# Patient Record
Sex: FEMALE | Race: BLACK OR AFRICAN AMERICAN | Employment: FULL TIME | URBAN - METROPOLITAN AREA
[De-identification: names, ages, dates, MRNs, and addresses within clinical notes are randomized per-mention and may not be internally consistent; named-entity substitution may affect disease eponyms.]

---

## 2017-02-27 NOTE — TELEPHONE ENCOUNTER
No future appointments.     LOV 2/16     LAST LAB    LAST RX  ValACYclovir HCl (VALTREX) 500 MG Oral Tab 90 tablet 2 2/24/2016      Sig :  1/2 tab daily        PROTOCOL  Herpes Agent Protocol Failed2/25 7:22 PM   Appointment in the past 12 or next 3 months

## 2017-02-28 RX ORDER — VALACYCLOVIR HYDROCHLORIDE 500 MG/1
TABLET, FILM COATED ORAL
Qty: 45 TABLET | Refills: 0 | OUTPATIENT
Start: 2017-02-28

## 2017-02-28 NOTE — TELEPHONE ENCOUNTER
Called patient's cell and left message to call clinic to schedule an appointment so we can refill medication.

## 2017-07-17 ENCOUNTER — OFFICE VISIT (OUTPATIENT)
Dept: FAMILY MEDICINE CLINIC | Facility: CLINIC | Age: 47
End: 2017-07-17

## 2017-07-17 VITALS
HEIGHT: 65 IN | SYSTOLIC BLOOD PRESSURE: 140 MMHG | BODY MASS INDEX: 27.66 KG/M2 | HEART RATE: 78 BPM | TEMPERATURE: 98 F | WEIGHT: 166 LBS | DIASTOLIC BLOOD PRESSURE: 76 MMHG

## 2017-07-17 DIAGNOSIS — N39.0 URINARY TRACT INFECTION, SITE UNSPECIFIED: ICD-10-CM

## 2017-07-17 DIAGNOSIS — R30.0 DYSURIA: Primary | ICD-10-CM

## 2017-07-17 LAB
MULTISTIX LOT#: NORMAL NUMERIC
NITRITE, URINE: POSITIVE
PH, URINE: 6 (ref 4.5–8)
PROTEIN (URINE DIPSTICK): 100 MG/DL
SPECIFIC GRAVITY: 1.02 (ref 1–1.03)
URINE-COLOR: YELLOW
UROBILINOGEN,SEMI-QN: 1 MG/DL (ref 0–1.9)

## 2017-07-17 PROCEDURE — 81003 URINALYSIS AUTO W/O SCOPE: CPT | Performed by: NURSE PRACTITIONER

## 2017-07-17 PROCEDURE — 87086 URINE CULTURE/COLONY COUNT: CPT | Performed by: NURSE PRACTITIONER

## 2017-07-17 PROCEDURE — 87186 SC STD MICRODIL/AGAR DIL: CPT | Performed by: NURSE PRACTITIONER

## 2017-07-17 PROCEDURE — 87088 URINE BACTERIA CULTURE: CPT | Performed by: NURSE PRACTITIONER

## 2017-07-17 PROCEDURE — 99213 OFFICE O/P EST LOW 20 MIN: CPT | Performed by: NURSE PRACTITIONER

## 2017-07-17 RX ORDER — SULFAMETHOXAZOLE AND TRIMETHOPRIM 800; 160 MG/1; MG/1
1 TABLET ORAL 2 TIMES DAILY
Qty: 10 TABLET | Refills: 0 | Status: SHIPPED | OUTPATIENT
Start: 2017-07-17 | End: 2017-07-22

## 2017-07-18 NOTE — PROGRESS NOTES
Romeo Vaca is a 42year old female. HPI:   Patient presents with urinary symptoms. Complaining of urinary frequency, urgency, suprapubic pain, back pain. Denies fever, hematuria.  Traveled from [place] where symptoms started  Duration: 2 days  Sexual ac GLUCOSE (URINE DIPSTICK) Neg Negative mg/dL   BILIRUBIN Neg Negative   KETONES (URINE DIPSTICK) Neg Negative mg/dL   SPECIFIC GRAVITY 1.020 1.005 - 1.030   OCCULT BLOOD Moderate Negative   PH, URINE 6.0 4.5 - 8.0   PROTEIN (URINE DIPSTICK) 100 Negative/T

## 2017-10-15 ENCOUNTER — OFFICE VISIT (OUTPATIENT)
Dept: FAMILY MEDICINE CLINIC | Facility: CLINIC | Age: 47
End: 2017-10-15

## 2017-10-15 VITALS
SYSTOLIC BLOOD PRESSURE: 140 MMHG | RESPIRATION RATE: 20 BRPM | HEART RATE: 78 BPM | WEIGHT: 166 LBS | BODY MASS INDEX: 27.66 KG/M2 | OXYGEN SATURATION: 100 % | TEMPERATURE: 98 F | DIASTOLIC BLOOD PRESSURE: 70 MMHG | HEIGHT: 65 IN

## 2017-10-15 DIAGNOSIS — R30.0 DYSURIA: ICD-10-CM

## 2017-10-15 DIAGNOSIS — N30.01 ACUTE CYSTITIS WITH HEMATURIA: Primary | ICD-10-CM

## 2017-10-15 PROCEDURE — 87186 SC STD MICRODIL/AGAR DIL: CPT | Performed by: NURSE PRACTITIONER

## 2017-10-15 PROCEDURE — 99213 OFFICE O/P EST LOW 20 MIN: CPT | Performed by: NURSE PRACTITIONER

## 2017-10-15 PROCEDURE — 87088 URINE BACTERIA CULTURE: CPT | Performed by: NURSE PRACTITIONER

## 2017-10-15 PROCEDURE — 81003 URINALYSIS AUTO W/O SCOPE: CPT | Performed by: NURSE PRACTITIONER

## 2017-10-15 PROCEDURE — 87086 URINE CULTURE/COLONY COUNT: CPT | Performed by: NURSE PRACTITIONER

## 2017-10-15 RX ORDER — NITROFURANTOIN 25; 75 MG/1; MG/1
100 CAPSULE ORAL 2 TIMES DAILY
Qty: 10 CAPSULE | Refills: 0 | Status: SHIPPED | OUTPATIENT
Start: 2017-10-15 | End: 2017-10-20

## 2017-10-15 NOTE — PROGRESS NOTES
Jordi Victor is a 41year old female. HPI:   Patient presents with symptoms of UTI for 2  days. Complaining of urinary frequency, urgency, dysuria. Denies back pain, fever, hematuria.   Pt has history of UTI in past.      No current outpatient presc Bladder Infection, Female (Adult)    Urine is normally doesn't have any bacteria in it. But bacteria can get into the urinary tract from the skin around the rectum. Or they can travel in the blood from elsewhere in the body.  Once they are in your urinary t · Bowel incontinence  · Pregnancy  · Procedures such as having a catheter inserted  · Older age  · Not emptying your bladder. This can allow bacteria a chance to grow in your urine.   · Dehydration  · Constipation  · Sex  · Use of a diaphragm for birth cont · Avoid caffeine, alcohol, and spicy foods. These can irritate your bladder. · Urinate right after intercourse to flush out your bladder. · If you use birth control pills and have frequent bladder infections, discuss it with your doctor.   Follow-up care

## 2018-02-08 RX ORDER — VALACYCLOVIR HYDROCHLORIDE 500 MG/1
TABLET, FILM COATED ORAL
Qty: 0 TABLET | Refills: 1 | OUTPATIENT
Start: 2018-02-08

## 2018-02-08 NOTE — TELEPHONE ENCOUNTER
Future Appointments  Date Time Provider Andrew Jil   2/14/2018 12:30 PM Matt Carlin MD [facility] [address]     LOV 2/16    LAST LAB 2/16    LAST RX 2/16    PROTOCOL  Matt Carlin MD          1:29 PM   Note      Please have patient make a f

## 2018-02-14 ENCOUNTER — OFFICE VISIT (OUTPATIENT)
Dept: FAMILY MEDICINE CLINIC | Facility: CLINIC | Age: 48
End: 2018-02-14

## 2018-02-14 VITALS
HEART RATE: 88 BPM | BODY MASS INDEX: 28.54 KG/M2 | WEIGHT: 173.38 LBS | HEIGHT: 65.5 IN | RESPIRATION RATE: 16 BRPM | SYSTOLIC BLOOD PRESSURE: 118 MMHG | DIASTOLIC BLOOD PRESSURE: 76 MMHG | TEMPERATURE: 98 F | OXYGEN SATURATION: 99 %

## 2018-02-14 DIAGNOSIS — A60.00 HERPES SIMPLEX INFECTION OF GENITOURINARY SYSTEM: Primary | ICD-10-CM

## 2018-02-14 PROCEDURE — 99213 OFFICE O/P EST LOW 20 MIN: CPT | Performed by: FAMILY MEDICINE

## 2018-02-14 RX ORDER — VALACYCLOVIR HYDROCHLORIDE 500 MG/1
TABLET, FILM COATED ORAL
Qty: 90 TABLET | Refills: 1 | Status: SHIPPED | OUTPATIENT
Start: 2018-02-14 | End: 2020-07-14

## 2018-02-26 NOTE — PROGRESS NOTES
Norma Carmona is a 41year old female. Patient presents with:  Medication Follow-Up: Refill of Valtrex    HPI:   Is seen for follow-up and medication refill. Using half a tablet of Valtrex daily was helping prevent her symptoms.   Ran out of medication

## 2018-04-12 ENCOUNTER — OFFICE VISIT (OUTPATIENT)
Dept: FAMILY MEDICINE CLINIC | Facility: CLINIC | Age: 48
End: 2018-04-12

## 2018-04-12 VITALS
DIASTOLIC BLOOD PRESSURE: 82 MMHG | WEIGHT: 176 LBS | HEIGHT: 65 IN | BODY MASS INDEX: 29.32 KG/M2 | RESPIRATION RATE: 16 BRPM | TEMPERATURE: 99 F | SYSTOLIC BLOOD PRESSURE: 120 MMHG | HEART RATE: 82 BPM | OXYGEN SATURATION: 98 %

## 2018-04-12 DIAGNOSIS — R30.0 DYSURIA: Primary | ICD-10-CM

## 2018-04-12 PROCEDURE — 81003 URINALYSIS AUTO W/O SCOPE: CPT | Performed by: NURSE PRACTITIONER

## 2018-04-12 PROCEDURE — 87086 URINE CULTURE/COLONY COUNT: CPT | Performed by: NURSE PRACTITIONER

## 2018-04-12 PROCEDURE — 87186 SC STD MICRODIL/AGAR DIL: CPT | Performed by: NURSE PRACTITIONER

## 2018-04-12 PROCEDURE — 87088 URINE BACTERIA CULTURE: CPT | Performed by: NURSE PRACTITIONER

## 2018-04-12 PROCEDURE — 99213 OFFICE O/P EST LOW 20 MIN: CPT | Performed by: NURSE PRACTITIONER

## 2018-04-12 RX ORDER — NITROFURANTOIN 25; 75 MG/1; MG/1
100 CAPSULE ORAL 2 TIMES DAILY
Qty: 14 CAPSULE | Refills: 0 | Status: SHIPPED | OUTPATIENT
Start: 2018-04-12 | End: 2018-04-19

## 2018-04-12 NOTE — PROGRESS NOTES
CHIEF COMPLAINT:   Patient presents with:  Burning On Urination: urgency and frequency  sx x last night. HPI:   Raul Kerns is a 41year old female who presents with symptoms of UTI.  Complaining of urinary frequency, urgency, dysuria for last 1 LUNGS: clear to ausculation bilaterally, no wheezing or rhonchi  GI: BS present x 4.   No hepatosplenomegaly, no tenderness  : no suprapubic tenderness, bladder distention, or CVAT   EXTREMITIES: no edema      Recent Results (from the past 24 hour(s))  -U Urine is normally doesn't have any bacteria in it. But bacteria can get into the urinary tract from the skin around the rectum. Or they can travel in the blood from elsewhere in the body.  Once they are in your urinary tract, they can cause infection in the · Procedures such as having a catheter inserted  · Older age  · Not emptying your bladder. This can allow bacteria a chance to grow in your urine.   · Dehydration  · Constipation  · Sex  · Use of a diaphragm for birth control   Treatment  Bladder infections · Urinate right after intercourse to flush out your bladder. · If you use birth control pills and have frequent bladder infections, discuss it with your doctor.   Follow-up care  Call your healthcare provider if all symptoms are not gone after 3 days of tr

## 2018-06-27 ENCOUNTER — OFFICE VISIT (OUTPATIENT)
Dept: FAMILY MEDICINE CLINIC | Facility: CLINIC | Age: 48
End: 2018-06-27

## 2018-06-27 VITALS
HEART RATE: 86 BPM | WEIGHT: 176 LBS | OXYGEN SATURATION: 100 % | DIASTOLIC BLOOD PRESSURE: 78 MMHG | RESPIRATION RATE: 20 BRPM | BODY MASS INDEX: 29.32 KG/M2 | HEIGHT: 65 IN | SYSTOLIC BLOOD PRESSURE: 122 MMHG | TEMPERATURE: 99 F

## 2018-06-27 DIAGNOSIS — R39.9 UTI SYMPTOMS: Primary | ICD-10-CM

## 2018-06-27 PROCEDURE — 87086 URINE CULTURE/COLONY COUNT: CPT | Performed by: PHYSICIAN ASSISTANT

## 2018-06-27 PROCEDURE — 87186 SC STD MICRODIL/AGAR DIL: CPT | Performed by: PHYSICIAN ASSISTANT

## 2018-06-27 PROCEDURE — 87077 CULTURE AEROBIC IDENTIFY: CPT | Performed by: PHYSICIAN ASSISTANT

## 2018-06-27 PROCEDURE — 99213 OFFICE O/P EST LOW 20 MIN: CPT | Performed by: PHYSICIAN ASSISTANT

## 2018-06-27 PROCEDURE — 81003 URINALYSIS AUTO W/O SCOPE: CPT | Performed by: PHYSICIAN ASSISTANT

## 2018-06-27 RX ORDER — SULFAMETHOXAZOLE AND TRIMETHOPRIM 800; 160 MG/1; MG/1
1 TABLET ORAL 2 TIMES DAILY
Qty: 10 TABLET | Refills: 0 | Status: SHIPPED | OUTPATIENT
Start: 2018-06-27 | End: 2019-07-10

## 2018-06-27 NOTE — PROGRESS NOTES
CHIEF COMPLAINT:   Patient presents with:  Urinary Frequency: pressure s/s since AM.  No OTC meds  Burning On Urination      HPI:   Elton Daniel is a 41year old female who presents with symptoms of UTI.  Complaining of urinary frequency, urgency, dysuri LUNGS: clear to ausculation bilaterally, no wheezing or rhonchi  GI: BS present x 4. No hepatosplenomegaly. : + suprapubic tenderness.  No bladder distention, or CVAT       Recent Results (from the past 24 hour(s))  -URINALYSIS, AUTO, W/O SCOPE   Collec The phrases \"bladder infection\", \"UTI,\" and \"cystitis,\" are often used to describe the same thing, but they are not always the same. Cystitis is an inflammation of the bladder. The  most common cause of cystitis is an infection.   In summary:  · Infec Bladder infections are diagnosed by a urine test. They are treated with antibiotics and usually clear up quickly without complications. Treatment helps prevent a more serious kidney infection.   Medicines  Medicines can help in the treatment of a bladder in · If you use birth control pills and have frequent bladder infections, discuss it with your doctor. Follow-up care  Return to this facility or see your doctor if all symptoms are not gone after 3 days of treatment.  This is especially important if you have

## 2018-06-30 ENCOUNTER — TELEPHONE (OUTPATIENT)
Dept: FAMILY MEDICINE CLINIC | Facility: CLINIC | Age: 48
End: 2018-06-30

## 2018-06-30 NOTE — TELEPHONE ENCOUNTER
TC to [facility]: reviewed urine culture results and plan to complete antibiotic. She states she is feeling much better. No further questions at this time.

## 2018-08-16 ENCOUNTER — OFFICE VISIT (OUTPATIENT)
Dept: FAMILY MEDICINE CLINIC | Facility: CLINIC | Age: 48
End: 2018-08-16

## 2018-08-16 VITALS
RESPIRATION RATE: 16 BRPM | DIASTOLIC BLOOD PRESSURE: 80 MMHG | HEART RATE: 80 BPM | BODY MASS INDEX: 28.49 KG/M2 | OXYGEN SATURATION: 98 % | HEIGHT: 65 IN | SYSTOLIC BLOOD PRESSURE: 118 MMHG | WEIGHT: 171 LBS | TEMPERATURE: 100 F

## 2018-08-16 DIAGNOSIS — R30.0 DYSURIA: Primary | ICD-10-CM

## 2018-08-16 LAB
BILIRUBIN: NEGATIVE
GLUCOSE (URINE DIPSTICK): NEGATIVE MG/DL
KETONES (URINE DIPSTICK): NEGATIVE MG/DL
MULTISTIX LOT#: ABNORMAL NUMERIC
NITRITE, URINE: POSITIVE
PH, URINE: 6.5 (ref 4.5–8)
PROTEIN (URINE DIPSTICK): 100 MG/DL
SPECIFIC GRAVITY: 1.02 (ref 1–1.03)
URINE-COLOR: YELLOW
UROBILINOGEN,SEMI-QN: 1 MG/DL (ref 0–1.9)

## 2018-08-16 PROCEDURE — 87077 CULTURE AEROBIC IDENTIFY: CPT | Performed by: NURSE PRACTITIONER

## 2018-08-16 PROCEDURE — 81003 URINALYSIS AUTO W/O SCOPE: CPT | Performed by: NURSE PRACTITIONER

## 2018-08-16 PROCEDURE — 87186 SC STD MICRODIL/AGAR DIL: CPT | Performed by: NURSE PRACTITIONER

## 2018-08-16 PROCEDURE — 87086 URINE CULTURE/COLONY COUNT: CPT | Performed by: NURSE PRACTITIONER

## 2018-08-16 PROCEDURE — 87184 SC STD DISK METHOD PER PLATE: CPT | Performed by: NURSE PRACTITIONER

## 2018-08-16 PROCEDURE — 99213 OFFICE O/P EST LOW 20 MIN: CPT | Performed by: NURSE PRACTITIONER

## 2018-08-16 RX ORDER — NITROFURANTOIN 25; 75 MG/1; MG/1
100 CAPSULE ORAL 2 TIMES DAILY
Qty: 14 CAPSULE | Refills: 0 | Status: SHIPPED | OUTPATIENT
Start: 2018-08-16 | End: 2018-08-23

## 2018-08-16 NOTE — PROGRESS NOTES
CHIEF COMPLAINT:   Patient presents with:  Urinary Frequency: sx x 2 days. HPI:   Zeyad Cohen is a 41year old female who presents with symptoms of UTI. Complaining of urinary frequency, urgency, and dysuria for last 2 days.  Symptoms have been wo 08/09/2018   SpO2 98%   Breastfeeding?  No   BMI 28.46 kg/m²   GENERAL: well developed, well nourished, and in no apparent distress  CARDIO: RRR, no murmurs  LUNGS: clear to ausculation bilaterally, no wheezing or rhonchi  GI: BS present x 4 and normoactive

## 2018-08-20 ENCOUNTER — TELEPHONE (OUTPATIENT)
Dept: FAMILY MEDICINE CLINIC | Facility: CLINIC | Age: 48
End: 2018-08-20

## 2018-08-20 NOTE — TELEPHONE ENCOUNTER
Called pt with final result of urine results, pt currently being treated with Macrobid: to which bacteria shows sensitivity.  Pt advised to maintain hydration with water, avoiding carbonated beverages, alcohol and caffeine while on medication, and to comple

## 2018-10-04 ENCOUNTER — OFFICE VISIT (OUTPATIENT)
Dept: FAMILY MEDICINE CLINIC | Facility: CLINIC | Age: 48
End: 2018-10-04

## 2018-10-04 VITALS
TEMPERATURE: 98 F | WEIGHT: 176 LBS | DIASTOLIC BLOOD PRESSURE: 82 MMHG | OXYGEN SATURATION: 98 % | HEART RATE: 84 BPM | SYSTOLIC BLOOD PRESSURE: 128 MMHG | BODY MASS INDEX: 29 KG/M2 | RESPIRATION RATE: 16 BRPM

## 2018-10-04 DIAGNOSIS — R30.0 DYSURIA: Primary | ICD-10-CM

## 2018-10-04 DIAGNOSIS — N39.0 RECURRENT UTI (URINARY TRACT INFECTION): ICD-10-CM

## 2018-10-04 PROCEDURE — 87186 SC STD MICRODIL/AGAR DIL: CPT | Performed by: NURSE PRACTITIONER

## 2018-10-04 PROCEDURE — 87088 URINE BACTERIA CULTURE: CPT | Performed by: NURSE PRACTITIONER

## 2018-10-04 PROCEDURE — 87086 URINE CULTURE/COLONY COUNT: CPT | Performed by: NURSE PRACTITIONER

## 2018-10-04 PROCEDURE — 99213 OFFICE O/P EST LOW 20 MIN: CPT | Performed by: NURSE PRACTITIONER

## 2018-10-04 PROCEDURE — 81003 URINALYSIS AUTO W/O SCOPE: CPT | Performed by: NURSE PRACTITIONER

## 2018-10-04 RX ORDER — NITROFURANTOIN 25; 75 MG/1; MG/1
100 CAPSULE ORAL 2 TIMES DAILY
Qty: 10 CAPSULE | Refills: 0 | Status: SHIPPED | OUTPATIENT
Start: 2018-10-04 | End: 2018-10-09

## 2018-10-04 NOTE — PATIENT INSTRUCTIONS
Urinary Tract Infections in Women    Urinary tract infections (UTIs) are most often caused by bacteria. These bacteria enter the urinary tract. The bacteria may come from outside the body.  Or they may travel from the skin outside the rectum or vagina int The lifestyle changes below will help get rid of your UTI. They may also help prevent future UTIs. · Drink plenty of fluids. This includes water, juice, or other caffeine-free drinks. Fluids help flush bacteria out of your body. · Empty your bladder.  Alw © 6770-7892 [employer] [address]. [address] [facility], [facility] [place]. All rights reserved. This information is not intended as a substitute for professional medical care. Always follow your healthcare professional's instructions.

## 2018-10-04 NOTE — PROGRESS NOTES
CHIEF COMPLAINT:   Patient presents with:  Burning On Urination: Frequency sx x 1 day. HPI:   Elton Daniel is a 57year old female who presents with symptoms of UTI. Complaining of urinary frequency, urgency, dysuria for 1 days.   Symptoms have bee LUNGS: clear to ausculation bilaterally, no wheezing or rhonchi  GI: BS present x 4. No hepatosplenomegaly. : Mild suprapubic tenderness.  No bladder distention, or CVAT     Recent Results (from the past 24 hour(s))   URINALYSIS, AUTO, W/O SCOPE    Kylee Urinary tract infections (UTIs) are most often caused by bacteria. These bacteria enter the urinary tract. The bacteria may come from outside the body. Or they may travel from the skin outside the rectum or vagina into the urethra.  Female anatomy makes it · Drink plenty of fluids. This includes water, juice, or other caffeine-free drinks. Fluids help flush bacteria out of your body. · Empty your bladder. Always empty your bladder when you feel the urge to urinate. And always urinate before going to sleep. © 5612-9300 [employer] [address]. [address] [facility], [facility] [place]. All rights reserved. This information is not intended as a substitute for professional medical care. Always follow your healthcare professional's instructions.

## 2018-12-20 ENCOUNTER — OFFICE VISIT (OUTPATIENT)
Dept: FAMILY MEDICINE CLINIC | Facility: CLINIC | Age: 48
End: 2018-12-20

## 2018-12-20 VITALS
HEIGHT: 65 IN | OXYGEN SATURATION: 98 % | BODY MASS INDEX: 28.66 KG/M2 | SYSTOLIC BLOOD PRESSURE: 120 MMHG | TEMPERATURE: 99 F | DIASTOLIC BLOOD PRESSURE: 80 MMHG | HEART RATE: 82 BPM | WEIGHT: 172 LBS | RESPIRATION RATE: 16 BRPM

## 2018-12-20 DIAGNOSIS — R30.0 DYSURIA: Primary | ICD-10-CM

## 2018-12-20 PROCEDURE — 87086 URINE CULTURE/COLONY COUNT: CPT | Performed by: PHYSICIAN ASSISTANT

## 2018-12-20 PROCEDURE — 81003 URINALYSIS AUTO W/O SCOPE: CPT | Performed by: PHYSICIAN ASSISTANT

## 2018-12-20 PROCEDURE — 99213 OFFICE O/P EST LOW 20 MIN: CPT | Performed by: PHYSICIAN ASSISTANT

## 2018-12-20 PROCEDURE — 87088 URINE BACTERIA CULTURE: CPT | Performed by: PHYSICIAN ASSISTANT

## 2018-12-20 PROCEDURE — 87186 SC STD MICRODIL/AGAR DIL: CPT | Performed by: PHYSICIAN ASSISTANT

## 2018-12-20 RX ORDER — NITROFURANTOIN 25; 75 MG/1; MG/1
100 CAPSULE ORAL 2 TIMES DAILY
Qty: 14 CAPSULE | Refills: 0 | Status: SHIPPED | OUTPATIENT
Start: 2018-12-20 | End: 2018-12-27

## 2018-12-20 NOTE — PROGRESS NOTES
CHIEF COMPLAINT:   Patient presents with:  Painful Urination: freqeuency sx started this morning. HPI:   Mirta Eli is a 57year old female who presents with symptoms of UTI. Complaining of urinary frequency, urgency, and dysuria for last 1 days. /80 (BP Location: Right arm, Patient Position: Sitting, Cuff Size: adult)   Pulse 82   Temp 99 °F (37.2 °C) (Oral)   Resp 16   Ht 65\"   Wt 172 lb   LMP 12/16/2018   SpO2 98%   BMI 28.62 kg/m²   GENERAL: well developed, well nourished, and in no appa Urinary Tract Infections in Women    Urinary tract infections (UTIs) are most often caused by bacteria. These bacteria enter the urinary tract. The bacteria may come from outside the body.  Or they may travel from the skin outside the rectum or vagina into The lifestyle changes below will help get rid of your UTI. They may also help prevent future UTIs. · Drink plenty of fluids. This includes water, juice, or other caffeine-free drinks. Fluids help flush bacteria out of your body. · Empty your bladder.  Alw

## 2019-07-10 ENCOUNTER — OFFICE VISIT (OUTPATIENT)
Dept: FAMILY MEDICINE CLINIC | Facility: CLINIC | Age: 49
End: 2019-07-10

## 2019-07-10 VITALS
BODY MASS INDEX: 27.16 KG/M2 | DIASTOLIC BLOOD PRESSURE: 78 MMHG | HEART RATE: 86 BPM | SYSTOLIC BLOOD PRESSURE: 116 MMHG | OXYGEN SATURATION: 100 % | WEIGHT: 163 LBS | TEMPERATURE: 99 F | HEIGHT: 65 IN | RESPIRATION RATE: 18 BRPM

## 2019-07-10 DIAGNOSIS — N30.01 ACUTE CYSTITIS WITH HEMATURIA: Primary | ICD-10-CM

## 2019-07-10 LAB
BILIRUBIN: NEGATIVE
GLUCOSE (URINE DIPSTICK): NEGATIVE MG/DL
KETONES (URINE DIPSTICK): NEGATIVE MG/DL
MULTISTIX LOT#: ABNORMAL NUMERIC
NITRITE, URINE: POSITIVE
PH, URINE: 6 (ref 4.5–8)
PROTEIN (URINE DIPSTICK): 300 MG/DL
SPECIFIC GRAVITY: 1.03 (ref 1–1.03)
UROBILINOGEN,SEMI-QN: 1 MG/DL (ref 0–1.9)

## 2019-07-10 PROCEDURE — 81003 URINALYSIS AUTO W/O SCOPE: CPT | Performed by: NURSE PRACTITIONER

## 2019-07-10 PROCEDURE — 87186 SC STD MICRODIL/AGAR DIL: CPT | Performed by: NURSE PRACTITIONER

## 2019-07-10 PROCEDURE — 87086 URINE CULTURE/COLONY COUNT: CPT | Performed by: NURSE PRACTITIONER

## 2019-07-10 PROCEDURE — 87088 URINE BACTERIA CULTURE: CPT | Performed by: NURSE PRACTITIONER

## 2019-07-10 PROCEDURE — 99213 OFFICE O/P EST LOW 20 MIN: CPT | Performed by: NURSE PRACTITIONER

## 2019-07-10 RX ORDER — NITROFURANTOIN 25; 75 MG/1; MG/1
100 CAPSULE ORAL 2 TIMES DAILY
Qty: 14 CAPSULE | Refills: 0 | Status: SHIPPED | OUTPATIENT
Start: 2019-07-10 | End: 2019-07-17

## 2019-07-10 NOTE — PROGRESS NOTES
CHIEF COMPLAINT:   Patient presents with:  Dysuria: with frequeny & urgency // x1 day    HPI:   Brittani Fisher is a 57year old female who presents with symptoms of UTI. Complaining of urinary frequency, urgency, dysuria for last 1 days.  Symptoms have bee /78 (BP Location: Right arm)   Pulse 86   Temp 98.9 °F (37.2 °C) (Oral)   Resp 18   Ht 65\"   Wt 163 lb   LMP 07/04/2019 (Exact Date)   SpO2 100%   Breastfeeding?  No   BMI 27.12 kg/m²   GENERAL: well developed, well nourished,in no apparent distress • Nitrofurantoin Monohyd Macro 100 MG Oral Cap 14 capsule 0     Sig: Take 1 capsule (100 mg total) by mouth 2 (two) times daily for 7 days. Risk and benefits of medication discussed. Stressed importance of completing full course of antibiotic.      Pa The most common cause of bladder infections is bacteria from the bowels. The bacteria get onto the skin around the opening of the urethra. From there, they can get into the urine and travel up to the bladder, causing inflammation and infection.  This usuall · Urinate more often. Don't try to hold urine in for a long time. · Wear loose-fitting clothes and cotton underwear. Avoid tight-fitting pants. · Improve your diet and prevent constipation.  Eat more fresh fruit and vegetables, and fiber, and less junk an The patient is asked to follow up with PCP in 3-5 days if not better. Call if fever, vomiting, worsening symptoms.

## 2019-07-19 ENCOUNTER — TELEPHONE (OUTPATIENT)
Dept: FAMILY MEDICINE CLINIC | Facility: CLINIC | Age: 49
End: 2019-07-19

## 2019-07-19 RX ORDER — NITROFURANTOIN 25; 75 MG/1; MG/1
100 CAPSULE ORAL 2 TIMES DAILY
Qty: 14 CAPSULE | Refills: 0 | Status: SHIPPED | OUTPATIENT
Start: 2019-07-19 | End: 2019-07-26

## 2019-07-19 NOTE — TELEPHONE ENCOUNTER
Patient called stating she lost her medication (Macrobid) after taking medication for only 3.5 days. She was feeling better but symptoms returned two days after stopping incomplete course. Will call in rx for complete 7 day course.  Please recheck if sympto

## 2019-10-20 DIAGNOSIS — A60.00 HERPES SIMPLEX INFECTION OF GENITOURINARY SYSTEM: ICD-10-CM

## 2019-10-21 NOTE — TELEPHONE ENCOUNTER
LOV 2-14-18    LAST LAB    LAST RX 2-14-18 90*1    Next OV No future appointments.     PROTOCOL    Name from pharmacy: Valacyclovir Hydrochloride 500 Mg Tab Nort          Will file in chart as: VALACYCLOVIR  MG Oral Tab         Sig: take 1/2 tablet b

## 2019-10-22 RX ORDER — VALACYCLOVIR HYDROCHLORIDE 500 MG/1
TABLET, FILM COATED ORAL
Qty: 45 TABLET | Refills: 0 | OUTPATIENT
Start: 2019-10-22

## 2020-07-14 ENCOUNTER — OFFICE VISIT (OUTPATIENT)
Dept: FAMILY MEDICINE CLINIC | Facility: CLINIC | Age: 50
End: 2020-07-14

## 2020-07-14 VITALS
WEIGHT: 160 LBS | TEMPERATURE: 97 F | OXYGEN SATURATION: 98 % | HEART RATE: 87 BPM | DIASTOLIC BLOOD PRESSURE: 78 MMHG | SYSTOLIC BLOOD PRESSURE: 136 MMHG | HEIGHT: 65 IN | BODY MASS INDEX: 26.66 KG/M2 | RESPIRATION RATE: 18 BRPM

## 2020-07-14 DIAGNOSIS — A60.00 HERPES SIMPLEX INFECTION OF GENITOURINARY SYSTEM: ICD-10-CM

## 2020-07-14 PROCEDURE — 99213 OFFICE O/P EST LOW 20 MIN: CPT | Performed by: FAMILY MEDICINE

## 2020-07-14 RX ORDER — ACYCLOVIR 50 MG/G
1 OINTMENT TOPICAL
Qty: 30 G | Refills: 1 | Status: SHIPPED | OUTPATIENT
Start: 2020-07-14 | End: 2020-07-21

## 2020-07-14 RX ORDER — VALACYCLOVIR HYDROCHLORIDE 500 MG/1
TABLET, FILM COATED ORAL
Qty: 90 TABLET | Refills: 1 | Status: SHIPPED | OUTPATIENT
Start: 2020-07-14

## 2020-07-14 RX ORDER — VALACYCLOVIR HYDROCHLORIDE 1 G/1
1 TABLET, FILM COATED ORAL EVERY 12 HOURS SCHEDULED
Qty: 14 TABLET | Refills: 0 | Status: SHIPPED | OUTPATIENT
Start: 2020-07-14 | End: 2020-07-21

## 2020-07-14 NOTE — PROGRESS NOTES
Mora Cedeno is a 54year old female. Patient presents with:  Herpes: refill    HPI:   Mora Cedeno is a 54year old female with history of genital herpes seen for follow up and medication refill.  States currently has a breakout as she ran out of he visit:    Herpes simplex infection of genitourinary system  -     valACYclovir HCl 500 MG Oral Tab; 1/2 a tablet daily  -     valACYclovir HCl 1 G Oral Tab; Take 1 tablet (1,000 mg total) by mouth every 12 (twelve) hours for 7 days.   -     acyclovir 5 % Ex

## 2020-07-14 NOTE — PATIENT INSTRUCTIONS
Living with Herpes  To speed healing, take care of open herpes sores. To reduce outbreaks, take care of your health. And talk with your healthcare provider about antiviral medicines.  To keep from infecting others, get treatment and talk with your provide · Talk with your healthcare provider about antiviral medicines to reduce outbreaks. To protect others  · Tell your current sex partner and any future partners that you have herpes. If you don’t know what to say, ask your healthcare provider for help.   ·

## 2020-07-15 ENCOUNTER — TELEPHONE (OUTPATIENT)
Dept: FAMILY MEDICINE CLINIC | Facility: CLINIC | Age: 50
End: 2020-07-15

## 2020-07-16 ENCOUNTER — TELEPHONE (OUTPATIENT)
Dept: FAMILY MEDICINE CLINIC | Facility: CLINIC | Age: 50
End: 2020-07-16

## 2020-07-16 NOTE — TELEPHONE ENCOUNTER
[phone]   for pt ([id number] Trina Moser per HIPAA) to inform received notice from [address] [place] [address] Street indicating acyclovir ointment not covered within pt's plan. No other covered alternative. Per PCP, ok to just take oral tablets as directed.  To call back at the office if a

## 2020-07-16 NOTE — TELEPHONE ENCOUNTER
Called and spoke with pharmacist to inquire of covered alternative. Denavir but indicated too expensive. No other known topical alternative. Please advise. Thank you.

## 2020-07-16 NOTE — TELEPHONE ENCOUNTER
Pharmacy left detailed msg re: Rx for acyclovir 5 % External Ointment, said it is not covered by Ins call transferred to triage

## 2021-05-05 ENCOUNTER — PATIENT OUTREACH (OUTPATIENT)
Dept: FAMILY MEDICINE CLINIC | Facility: CLINIC | Age: 51
End: 2021-05-05

## 2021-09-24 DIAGNOSIS — A60.00 HERPES SIMPLEX INFECTION OF GENITOURINARY SYSTEM: ICD-10-CM

## 2021-09-24 RX ORDER — VALACYCLOVIR HYDROCHLORIDE 500 MG/1
TABLET, FILM COATED ORAL
Qty: 90 TABLET | Refills: 0 | OUTPATIENT
Start: 2021-09-24

## 2021-09-24 NOTE — TELEPHONE ENCOUNTER
Patient has not been seen since last year and needs to schedule follow-up appointment for medication refill.

## 2021-09-24 NOTE — TELEPHONE ENCOUNTER
LOV 7-14-20    LAST LAB    LAST RX 7-14-20 90*1    Next OV No future appointments.     PROTOCOL  Name from pharmacy: Valacyclovir Hydrochloride 500 Mg Tab [place]          Will file in chart as: VALACYCLOVIR 500 MG Oral Tab    Sig: take a 1/2 of tablet by mout

## 2022-08-06 ENCOUNTER — OFFICE VISIT (OUTPATIENT)
Dept: FAMILY MEDICINE CLINIC | Facility: CLINIC | Age: 52
End: 2022-08-06
Payer: COMMERCIAL

## 2022-08-06 VITALS
OXYGEN SATURATION: 97 % | RESPIRATION RATE: 18 BRPM | TEMPERATURE: 98 F | BODY MASS INDEX: 26.33 KG/M2 | DIASTOLIC BLOOD PRESSURE: 78 MMHG | HEIGHT: 65 IN | WEIGHT: 158 LBS | HEART RATE: 75 BPM | SYSTOLIC BLOOD PRESSURE: 135 MMHG

## 2022-08-06 DIAGNOSIS — R39.9 UTI SYMPTOMS: Primary | ICD-10-CM

## 2022-08-06 LAB
APPEARANCE: CLEAR
BILIRUBIN: NEGATIVE
GLUCOSE (URINE DIPSTICK): NEGATIVE MG/DL
KETONES (URINE DIPSTICK): NEGATIVE MG/DL
MULTISTIX LOT#: ABNORMAL NUMERIC
NITRITE, URINE: NEGATIVE
OCCULT BLOOD: NEGATIVE
PH, URINE: 6.5 (ref 4.5–8)
PROTEIN (URINE DIPSTICK): NEGATIVE MG/DL
SPECIFIC GRAVITY: 1.02 (ref 1–1.03)
URINE-COLOR: YELLOW
UROBILINOGEN,SEMI-QN: 0.2 MG/DL (ref 0–1.9)

## 2022-08-06 PROCEDURE — 3075F SYST BP GE 130 - 139MM HG: CPT | Performed by: NURSE PRACTITIONER

## 2022-08-06 PROCEDURE — 87077 CULTURE AEROBIC IDENTIFY: CPT | Performed by: NURSE PRACTITIONER

## 2022-08-06 PROCEDURE — 87186 SC STD MICRODIL/AGAR DIL: CPT | Performed by: NURSE PRACTITIONER

## 2022-08-06 PROCEDURE — 3008F BODY MASS INDEX DOCD: CPT | Performed by: NURSE PRACTITIONER

## 2022-08-06 PROCEDURE — 81003 URINALYSIS AUTO W/O SCOPE: CPT | Performed by: NURSE PRACTITIONER

## 2022-08-06 PROCEDURE — 87086 URINE CULTURE/COLONY COUNT: CPT | Performed by: NURSE PRACTITIONER

## 2022-08-06 PROCEDURE — 3078F DIAST BP <80 MM HG: CPT | Performed by: NURSE PRACTITIONER

## 2022-08-06 PROCEDURE — 99213 OFFICE O/P EST LOW 20 MIN: CPT | Performed by: NURSE PRACTITIONER

## 2022-08-06 RX ORDER — NITROFURANTOIN 25; 75 MG/1; MG/1
100 CAPSULE ORAL 2 TIMES DAILY
Qty: 14 CAPSULE | Refills: 0 | Status: SHIPPED | OUTPATIENT
Start: 2022-08-06 | End: 2022-08-13

## 2022-08-06 RX ORDER — PHENAZOPYRIDINE HYDROCHLORIDE 200 MG/1
200 TABLET, FILM COATED ORAL 3 TIMES DAILY PRN
Qty: 10 TABLET | Refills: 0 | Status: SHIPPED | OUTPATIENT
Start: 2022-08-06

## 2022-08-22 ENCOUNTER — TELEPHONE (OUTPATIENT)
Dept: FAMILY MEDICINE CLINIC | Facility: CLINIC | Age: 52
End: 2022-08-22

## 2022-08-22 NOTE — TELEPHONE ENCOUNTER
Pt called wanting an elo this week for birth control. Last phy done here was 2016. Not seen here since 7-2020. She said she does not understand why Dr Rosalva Baig give her the med . What are people  suppose to do when they need the medication. Was suppose to start her pkg last night.

## 2022-08-24 NOTE — TELEPHONE ENCOUNTER
Pt called stating she went and scheduled appt with OBGYN for meds. Wants to schedule annual. Will call pt back in the morning.

## 2024-01-31 ENCOUNTER — OFFICE VISIT (OUTPATIENT)
Dept: FAMILY MEDICINE CLINIC | Facility: CLINIC | Age: 54
End: 2024-01-31
Payer: COMMERCIAL

## 2024-01-31 VITALS
RESPIRATION RATE: 18 BRPM | BODY MASS INDEX: 26.16 KG/M2 | DIASTOLIC BLOOD PRESSURE: 84 MMHG | WEIGHT: 157 LBS | OXYGEN SATURATION: 99 % | HEIGHT: 65 IN | SYSTOLIC BLOOD PRESSURE: 143 MMHG | TEMPERATURE: 98 F | HEART RATE: 83 BPM

## 2024-01-31 DIAGNOSIS — R39.9 UTI SYMPTOMS: Primary | ICD-10-CM

## 2024-01-31 DIAGNOSIS — R03.0 ELEVATED BLOOD PRESSURE READING: ICD-10-CM

## 2024-01-31 LAB
BILIRUBIN: NEGATIVE
GLUCOSE (URINE DIPSTICK): NEGATIVE MG/DL
KETONES (URINE DIPSTICK): NEGATIVE MG/DL
MULTISTIX LOT#: ABNORMAL NUMERIC
NITRITE, URINE: NEGATIVE
PH, URINE: 6.5 (ref 4.5–8)
PROTEIN (URINE DIPSTICK): 100 MG/DL
SPECIFIC GRAVITY: 1.02 (ref 1–1.03)
URINE-COLOR: YELLOW
UROBILINOGEN,SEMI-QN: 1 MG/DL (ref 0–1.9)

## 2024-01-31 PROCEDURE — 3079F DIAST BP 80-89 MM HG: CPT | Performed by: NURSE PRACTITIONER

## 2024-01-31 PROCEDURE — 3008F BODY MASS INDEX DOCD: CPT | Performed by: NURSE PRACTITIONER

## 2024-01-31 PROCEDURE — 87086 URINE CULTURE/COLONY COUNT: CPT | Performed by: NURSE PRACTITIONER

## 2024-01-31 PROCEDURE — 81003 URINALYSIS AUTO W/O SCOPE: CPT | Performed by: NURSE PRACTITIONER

## 2024-01-31 PROCEDURE — 99213 OFFICE O/P EST LOW 20 MIN: CPT | Performed by: NURSE PRACTITIONER

## 2024-01-31 PROCEDURE — 87186 SC STD MICRODIL/AGAR DIL: CPT | Performed by: NURSE PRACTITIONER

## 2024-01-31 PROCEDURE — 87088 URINE BACTERIA CULTURE: CPT | Performed by: NURSE PRACTITIONER

## 2024-01-31 PROCEDURE — 3077F SYST BP >= 140 MM HG: CPT | Performed by: NURSE PRACTITIONER

## 2024-01-31 RX ORDER — NITROFURANTOIN 25; 75 MG/1; MG/1
100 CAPSULE ORAL 2 TIMES DAILY
Qty: 10 CAPSULE | Refills: 0 | Status: SHIPPED | OUTPATIENT
Start: 2024-01-31 | End: 2024-02-05

## 2024-01-31 NOTE — PATIENT INSTRUCTIONS
1. Take Macrobid as prescribed.  2. Drink plenty of fluids to keep well-hydrated.   3. Ensure that you urinate frequently, emptying your bladder completely each time.   4. Follow up with primary care physician in the next 1-2 weeks.   5. Return to care sooner if symptoms do not improve in the next 2-3 days or you develop fever, flank pain, increased blood in urine, inability to urinate.  6. Take AZO Urinary Relief OTC as directed. Take after meals. Do not take this medication for more than 2 days time.  7. We will notify you by phone of urine culture results in the next few days. At this time we will suggest to either discontinue, change, or continue the medication.

## 2024-01-31 NOTE — PROGRESS NOTES
Subjective:   Patient ID: Yvonne Washburn is a 53 year old female.    Patient is a 53 year old female who presents today with complaints of urinary odor, cloudy urine, urinary frequency x yesterday. Also had some low back pain this morning but thinks it was due to sleeping on the couch last night. Denies fevers, body aches, chills, dysuria, n/v/d, abdominal pain, vaginal bleeding or discharge, hematuria or flank pain. Is not sexually active, denies concern for STI's. Tolerating PO well at home. Attempted treatment prior to arrival = none.         History/Other:   Review of Systems   Constitutional:  Negative for appetite change, chills and fever.   Gastrointestinal:  Negative for abdominal pain, diarrhea, nausea and vomiting.   Genitourinary:  Positive for frequency. Negative for dysuria, flank pain, hematuria, vaginal bleeding and vaginal discharge.        + foul smelling, cloudy urine   Musculoskeletal:  Positive for back pain.     Current Outpatient Medications   Medication Sig Dispense Refill    nitrofurantoin monohydrate macro (MACROBID) 100 MG Oral Cap Take 1 capsule (100 mg total) by mouth 2 (two) times daily for 5 days. 10 capsule 0    valACYclovir HCl 500 MG Oral Tab 1/2 a tablet daily (Patient not taking: Reported on 1/31/2024) 90 tablet 1     Allergies:No Known Allergies    /84   Pulse 83   Temp 98.4 °F (36.9 °C) (Temporal)   Resp 18   Ht 5' 5\" (1.651 m)   Wt 157 lb (71.2 kg)   LMP 01/15/2024 (Approximate)   SpO2 99%   BMI 26.13 kg/m²       Objective:   Physical Exam  Vitals reviewed.   Constitutional:       General: She is awake. She is not in acute distress.     Appearance: Normal appearance. She is well-developed and well-groomed. She is not ill-appearing, toxic-appearing or diaphoretic.   HENT:      Head: Normocephalic and atraumatic.   Cardiovascular:      Rate and Rhythm: Normal rate and regular rhythm.   Pulmonary:      Effort: Pulmonary effort is normal. No respiratory distress.       Breath sounds: Normal breath sounds and air entry. No decreased breath sounds, wheezing, rhonchi or rales.   Skin:     General: Skin is warm and dry.   Neurological:      Mental Status: She is alert and oriented to person, place, and time.   Psychiatric:         Behavior: Behavior is cooperative.         Assessment & Plan:   1. UTI symptoms    2. Elevated blood pressure reading        Orders Placed This Encounter   Procedures    URINALYSIS, AUTO, W/O SCOPE    Urine Culture, Routine     Results for orders placed or performed in visit on 01/31/24   URINALYSIS, AUTO, W/O SCOPE    Collection Time: 01/31/24  3:24 PM   Result Value Ref Range    Glucose Urine Negative Negative mg/dL    Bilirubin Urine Negative Negative    Ketones, UA Negative Negative - Trace mg/dL    Spec Gravity 1.020 1.005 - 1.030    Blood Urine Trace-intact (A) Negative    PH Urine 6.5 5.0 - 8.0    Protein Urine 100 (A) Negative - Trace mg/dL    Urobilinogen Urine 1.0 0.2 - 1.0 mg/dL    Nitrite Urine Negative Negative    Leukocyte Esterase Urine Moderate (A) Negative    APPEARANCE cloudy Clear    Color Urine yellow Yellow    Multistix Lot# [id number] Numeric    Multistix Expiration Date 08-31-24 Date       Meds This Visit:  Requested Prescriptions     Signed Prescriptions Disp Refills    nitrofurantoin monohydrate macro (MACROBID) 100 MG Oral Cap 10 capsule 0     Sig: Take 1 capsule (100 mg total) by mouth 2 (two) times daily for 5 days.     Reviewed POC test results with patient.  Urine cx collected and sent. Will notify of results.  Reassuring physical exam findings. Vitals WNL, elevated BP x2. Patient reports nervousness with going to healthcare clinics.  Due to HPI, duration of symptoms and clinical exam findings - will start treatment today for suspected UTI with Macrobid.  Supportive care and return to care measures reviewed.  Patient v/u and is comfortable with this plan.    Patient Instructions   1. Take Macrobid as prescribed.  2. Drink plenty of  fluids to keep well-hydrated.   3. Ensure that you urinate frequently, emptying your bladder completely each time.   4. Follow up with primary care physician in the next 1-2 weeks.   5. Return to care sooner if symptoms do not improve in the next 2-3 days or you develop fever, flank pain, increased blood in urine, inability to urinate.  6. Take AZO Urinary Relief OTC as directed. Take after meals. Do not take this medication for more than 2 days time.  7. We will notify you by phone of urine culture results in the next few days. At this time we will suggest to either discontinue, change, or continue the medication.       Patient Instructions   1. Take Macrobid as prescribed.  2. Drink plenty of fluids to keep well-hydrated.   3. Ensure that you urinate frequently, emptying your bladder completely each time.   4. Follow up with primary care physician in the next 1-2 weeks.   5. Return to care sooner if symptoms do not improve in the next 2-3 days or you develop fever, flank pain, increased blood in urine, inability to urinate.  6. Take AZO Urinary Relief OTC as directed. Take after meals. Do not take this medication for more than 2 days time.  7. We will notify you by phone of urine culture results in the next few days. At this time we will suggest to either discontinue, change, or continue the medication.

## 2024-02-11 ENCOUNTER — OFFICE VISIT (OUTPATIENT)
Dept: FAMILY MEDICINE CLINIC | Facility: CLINIC | Age: 54
End: 2024-02-11
Payer: COMMERCIAL

## 2024-02-11 VITALS
RESPIRATION RATE: 16 BRPM | DIASTOLIC BLOOD PRESSURE: 63 MMHG | WEIGHT: 157 LBS | BODY MASS INDEX: 26.16 KG/M2 | SYSTOLIC BLOOD PRESSURE: 138 MMHG | HEART RATE: 63 BPM | OXYGEN SATURATION: 100 % | TEMPERATURE: 99 F | HEIGHT: 65 IN

## 2024-02-11 DIAGNOSIS — R39.15 URGENCY OF URINATION: Primary | ICD-10-CM

## 2024-02-11 LAB
BILIRUBIN: NEGATIVE
GLUCOSE (URINE DIPSTICK): NEGATIVE MG/DL
KETONES (URINE DIPSTICK): NEGATIVE MG/DL
MULTISTIX LOT#: ABNORMAL NUMERIC
NITRITE, URINE: NEGATIVE
PH, URINE: 5.5 (ref 4.5–8)
PROTEIN (URINE DIPSTICK): 100 MG/DL
SPECIFIC GRAVITY: 1.02 (ref 1–1.03)
URINE-COLOR: YELLOW
UROBILINOGEN,SEMI-QN: 0.2 MG/DL (ref 0–1.9)

## 2024-02-11 PROCEDURE — 3078F DIAST BP <80 MM HG: CPT | Performed by: PHYSICIAN ASSISTANT

## 2024-02-11 PROCEDURE — 3075F SYST BP GE 130 - 139MM HG: CPT | Performed by: PHYSICIAN ASSISTANT

## 2024-02-11 PROCEDURE — 3008F BODY MASS INDEX DOCD: CPT | Performed by: PHYSICIAN ASSISTANT

## 2024-02-11 PROCEDURE — 87086 URINE CULTURE/COLONY COUNT: CPT | Performed by: PHYSICIAN ASSISTANT

## 2024-02-11 PROCEDURE — 81003 URINALYSIS AUTO W/O SCOPE: CPT | Performed by: PHYSICIAN ASSISTANT

## 2024-02-11 PROCEDURE — 99214 OFFICE O/P EST MOD 30 MIN: CPT | Performed by: PHYSICIAN ASSISTANT

## 2024-02-11 RX ORDER — CIPROFLOXACIN 500 MG/1
500 TABLET, FILM COATED ORAL 2 TIMES DAILY
Qty: 14 TABLET | Refills: 0 | Status: SHIPPED | OUTPATIENT
Start: 2024-02-11 | End: 2024-02-18

## 2024-02-11 NOTE — PROGRESS NOTES
CHIEF COMPLAINT:     Chief Complaint   Patient presents with    UTI     Finished antibiotics 3 days ago, urgency developed yesterday, mild bloating, no otc         HPI:   Yvonne Washburn is a 53 year old female who presents with symptoms of UTI. Complaining of urinary urgency and dysuria for last 1 day. Symptoms have been worsening since onset.  Treatments tried: nothing.  Associated symptoms: malodorous urine.   Denies flank pain, abdominal pain, fever, hematuria, nausea, or vomiting.  Denies vaginal discharge or itching.  Denies need for STD/STI testing.     Current Outpatient Medications   Medication Sig Dispense Refill    ciprofloxacin 500 MG Oral Tab Take 1 tablet (500 mg total) by mouth 2 (two) times daily for 7 days. 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  EYES:denies blurred vision or double vision  HEENT: no congestion, rhinorrhea, sore throat or ear pain  CARDIOVASCULAR: denies chest pain or  palpitations  LUNGS: denies shortness of breath, cough, or wheezing  GI: See HPI. No N/V/C/D.   : See HPI.  NEURO: no headaches.    EXAM:   /63   Pulse 63   Temp 98.6 °F (37 °C)   Resp 16   Ht 5' 5\" (1.651 m)   Wt 157 lb (71.2 kg)   LMP 02/07/2024 (Exact Date)   SpO2 100%   BMI 26.13 kg/m²   GENERAL: well developed, well nourished,in no apparent distress  CARDIO: RRR, no murmurs  LUNGS: clear to ausculation bilaterally, no wheezing or rhonchi  GI: BS present x 4.  No hepatosplenomegaly.  No abdominal TTP x 4Q.  : No suprapubic tenderness. No bladder distention, or CVAT.      Recent Results (from the past 24 hour(s))   Urine Dip, auto without Micro    Collection Time: 02/11/24  9:29 AM   Result Value Ref Range    Glucose Urine Negative Negative mg/dL    Bilirubin Urine Negative Negative    Ketones, UA Negative Negative - Trace mg/dL    Spec Gravity 1.025 1.005 - 1.030    Blood Urine Moderate (A) Negative    PH Urine 5.5 5.0 - 8.0    Protein Urine 100 (A) Negative - Trace mg/dL    Urobilinogen Urine 0.2 0.2 - 1.0 mg/dL    Nitrite Urine Negative Negative    Leukocyte Esterase Urine Moderate (A) Negative    APPEARANCE cloudy Clear    Color Urine yellow Yellow    Multistix Lot# [id number] Numeric    Multistix Expiration Date 8/31/24 Date         ASSESSMENT AND PLAN:   Yvonne Washburn is a 53 year old female presents with UTI symptoms.    ASSESSMENT:  Encounter Diagnosis   Name Primary?    Urgency of urination Yes       PLAN: Meds as listed below.  Comfort measures as described in Patient Instructions.  -Patient with likely treatment failure of macrobid.  Patient reports she took her medication as prescribed.  Discussed alternate treatment options.  Patient would like to proceed with cipro for 7 days.  Discussed black box warning and patient would still like to proceed.  Advised to avoid strenuous activity.  Also discussed treatment length for complicated cystitis, which is 5-7 days.  Patient would prefer a  7 day course.     Meds & Refills for this Visit:  Requested Prescriptions     Signed Prescriptions Disp Refills    ciprofloxacin 500 MG Oral Tab 14 tablet 0     Sig: Take 1 tablet (500 mg total) by mouth 2 (two) times daily for 7 days.       Risk and benefits of medication discussed. Stressed importance of completing full course of antibiotic unless told otherwise.     Patient Instructions   -Push fluids- gatorade, water, cranberry juice.  -Will call in 1-3 days with urine culture results  -If have increased urinary urgency, urinary frequency, blood in urine, fevers, chills, sweats, back pain, or abdominal pain, please seek medical care immediately.    What can you do to help prevent bladder infections?   Urinate often during the day. You should also urinate after you have sex.   If you are a woman, it is important to:   Keep the area around your vagina clean.   Wipe from front to back after you go to the bathroom.   Gently wash the area around your vagina when you bathe or shower.   Wear cotton underwear.   Use pantyhose with cotton crotches.   Avoid tight clothing. Wear loose pants.   Do not wear a wet bathing suit for a long time.        The patient indicates understanding of these issues and agrees to the plan.  The patient is asked to return in 3 days if not better. Call if fever, vomiting, worsening symptoms.

## 2024-02-11 NOTE — PATIENT INSTRUCTIONS
-Push fluids- gatorade, water, cranberry juice.  -Will call in 1-3 days with urine culture results  -If have increased urinary urgency, urinary frequency, blood in urine, fevers, chills, sweats, back pain, or abdominal pain, please seek medical care immediately.    What can you do to help prevent bladder infections?   Urinate often during the day. You should also urinate after you have sex.   If you are a woman, it is important to:   Keep the area around your vagina clean.   Wipe from front to back after you go to the bathroom.   Gently wash the area around your vagina when you bathe or shower.   Wear cotton underwear.   Use pantyhose with cotton crotches.   Avoid tight clothing. Wear loose pants.   Do not wear a wet bathing suit for a long time.

## 2024-03-01 ENCOUNTER — TELEPHONE (OUTPATIENT)
Dept: FAMILY MEDICINE CLINIC | Facility: CLINIC | Age: 54
End: 2024-03-01

## 2024-03-01 NOTE — TELEPHONE ENCOUNTER
Pt called and wants to schedule an annual visit. LOV was 7/14/2020. Prefers Dr MADDOX. Pt is asking if she can re-establish care with Dr MADDOX as NP - she has [facility] HMO and PCP is still assigned. Pls advise

## 2024-03-04 NOTE — TELEPHONE ENCOUNTER
Scheduled    Future Appointments   Date Time Provider Department Center   4/3/2024 11:00 AM Neelam Dean MD [facility]

## 2024-04-03 ENCOUNTER — OFFICE VISIT (OUTPATIENT)
Dept: FAMILY MEDICINE CLINIC | Facility: CLINIC | Age: 54
End: 2024-04-03
Payer: COMMERCIAL

## 2024-04-03 VITALS
OXYGEN SATURATION: 98 % | BODY MASS INDEX: 25.99 KG/M2 | TEMPERATURE: 99 F | HEART RATE: 99 BPM | HEIGHT: 65 IN | SYSTOLIC BLOOD PRESSURE: 130 MMHG | RESPIRATION RATE: 16 BRPM | WEIGHT: 156 LBS | DIASTOLIC BLOOD PRESSURE: 72 MMHG

## 2024-04-03 DIAGNOSIS — Z00.00 ROUTINE GENERAL MEDICAL EXAMINATION AT A HEALTH CARE FACILITY: Primary | ICD-10-CM

## 2024-04-03 DIAGNOSIS — Z23 NEED FOR VACCINATION: ICD-10-CM

## 2024-04-03 DIAGNOSIS — Z83.2 FAMILY HISTORY OF THALASSEMIA: ICD-10-CM

## 2024-04-03 DIAGNOSIS — Z12.11 SCREENING FOR COLON CANCER: ICD-10-CM

## 2024-04-03 DIAGNOSIS — A60.00 HERPES SIMPLEX INFECTION OF GENITOURINARY SYSTEM: ICD-10-CM

## 2024-04-03 DIAGNOSIS — R05.1 ACUTE COUGH: ICD-10-CM

## 2024-04-03 DIAGNOSIS — E55.9 VITAMIN D DEFICIENCY: ICD-10-CM

## 2024-04-03 DIAGNOSIS — Z12.31 ENCOUNTER FOR SCREENING MAMMOGRAM FOR MALIGNANT NEOPLASM OF BREAST: ICD-10-CM

## 2024-04-03 DIAGNOSIS — D64.9 ANEMIA, UNSPECIFIED TYPE: ICD-10-CM

## 2024-04-03 PROCEDURE — 3078F DIAST BP <80 MM HG: CPT | Performed by: FAMILY MEDICINE

## 2024-04-03 PROCEDURE — 90715 TDAP VACCINE 7 YRS/> IM: CPT | Performed by: FAMILY MEDICINE

## 2024-04-03 PROCEDURE — 90471 IMMUNIZATION ADMIN: CPT | Performed by: FAMILY MEDICINE

## 2024-04-03 PROCEDURE — 99386 PREV VISIT NEW AGE 40-64: CPT | Performed by: FAMILY MEDICINE

## 2024-04-03 PROCEDURE — 99203 OFFICE O/P NEW LOW 30 MIN: CPT | Performed by: FAMILY MEDICINE

## 2024-04-03 PROCEDURE — 3075F SYST BP GE 130 - 139MM HG: CPT | Performed by: FAMILY MEDICINE

## 2024-04-03 PROCEDURE — 3008F BODY MASS INDEX DOCD: CPT | Performed by: FAMILY MEDICINE

## 2024-04-03 RX ORDER — VALACYCLOVIR HYDROCHLORIDE 500 MG/1
TABLET, FILM COATED ORAL
Qty: 90 TABLET | Refills: 1 | Status: SHIPPED | OUTPATIENT
Start: 2024-04-03

## 2024-04-03 NOTE — PROGRESS NOTES
Yvonne Washburn is a 53 year old female.  Chief Complaint   Patient presents with    [facility] Care    Hypertension    Cough     HPI:   Yvonne Washburn is a 53 year old female with past medical history of hypertension currently not on any medication seen today to reestablish care and for annual physical.  Patient states her Pap was done more than 3 years ago by her GYN Dr. Jose and was normal.  Menstrual cycle is still very regular.  Does do breast self-exam, no family history of breast cancer, has never done a mammogram.  Does have history of constipation and hemorrhoids, has never done a colonoscopy, no family history of colon cancer.    Patient states does have a strong family history of thalassemia.    Does not eat very healthy and does not exercise.    Patient complaining of dry cough for the past couple of days, has been taking over-the-counter lozenges, denies any nasal congestion, shortness of breath or sinus pressure.    Patient states has not been taking valtrex, gets a breakout every month right before her period starts, would like to restart medication.    PAST MEDICAL HISTORY:     Past Medical History:   Diagnosis Date    Anemia, unspecified     Essential hypertension, benign     Genital herpes     High blood pressure     History of pregnancy 3/1996, 1999, 2001, 2002, 3/2009    childbirth x 5     PAST SURGICAL HISTORY:     Past Surgical History:   Procedure Laterality Date           ALLERGY:   No Known Allergies  MEDICATIONS:     No current outpatient medications on file.     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   Tobacco Use    Smoking status: Never    Smokeless tobacco: Never    Tobacco comments:     nonsmoker   Substance and Sexual Activity    Alcohol use: Not Currently     Alcohol/week: 10.0 standard drinks of alcohol     Types: 10 Standard drinks or equivalent per week     Comment: twice a year    Drug use: No    Sexual activity: Never     Partners: Male   Other Topics Concern    Caffeine Concern No    Stress Concern No    Weight Concern No    Special Diet No    Exercise No    Seat Belt Yes   Social History Narrative    Balanced diet sometimes, no exercise     REVIEW OF SYSTEMS:   Review of Systems   Constitutional:  Negative for appetite change, fatigue, fever and unexpected weight change.   HENT:  Negative for congestion, ear pain, hearing loss and sore throat.    Eyes:  Negative for discharge, redness and visual disturbance.   Respiratory:  Negative for cough, chest tightness and shortness of breath.    Cardiovascular:  Negative for chest pain and palpitations.   Gastrointestinal:  Negative for abdominal pain, anal bleeding (has hemorrhoids), blood in stool, constipation and nausea.   Endocrine: Negative for cold intolerance, heat intolerance and polyuria.   Genitourinary:  Negative for difficulty urinating, dysuria, frequency and urgency.   Musculoskeletal:  Negative for arthralgias, gait problem, joint swelling and myalgias.   Skin:  Negative for rash.   Allergic/Immunologic: Negative for food allergies.   Neurological:  Negative for dizziness, weakness, numbness and headaches.   Hematological:  Negative for adenopathy. Does not bruise/bleed easily.   Psychiatric/Behavioral:  Negative for dysphoric mood and sleep disturbance. The patient is not nervous/anxious.       PHYSICAL EXAM:   /72   Pulse 99   Temp 99 °F (37.2 °C) (Temporal)   Resp 16   Ht 5' 5\" (1.651 m)   Wt 156 lb (70.8 kg)   LMP 02/07/2024  (Exact Date)   SpO2 98%   BMI 25.96 kg/m²     Physical Exam  Constitutional:       General: She is not in acute distress.     Appearance: Normal appearance. She is well-developed and normal weight.   HENT:      Head: Normocephalic and atraumatic.      Right Ear: Tympanic membrane and external ear normal.      Left Ear: Tympanic membrane and external ear normal.      Nose: Nose normal.      Mouth/Throat:      Mouth: Mucous membranes are moist.      Pharynx: Oropharynx is clear.   Eyes:      Extraocular Movements: Extraocular movements intact.      Conjunctiva/sclera: Conjunctivae normal.      Pupils: Pupils are equal, round, and reactive to light.      Comments: Wears glasses   Neck:      Thyroid: No thyromegaly.   Cardiovascular:      Rate and Rhythm: Normal rate and regular rhythm.      Pulses: Normal pulses.      Heart sounds: Normal heart sounds. No murmur heard.  Pulmonary:      Effort: Pulmonary effort is normal. No respiratory distress.      Breath sounds: Normal breath sounds.   Chest:      Chest wall: No tenderness.   Breasts:     Right: Normal. No swelling, inverted nipple, mass, nipple discharge, skin change or tenderness.      Left: Normal. No swelling, inverted nipple, mass, nipple discharge, skin change or tenderness.   Abdominal:      General: Bowel sounds are normal. There is no distension.      Palpations: Abdomen is soft. There is no hepatomegaly, splenomegaly or mass.      Tenderness: There is no abdominal tenderness.      Hernia: No hernia is present.   Musculoskeletal:         General: Normal range of motion.      Cervical back: Normal range of motion and neck supple.      Right lower leg: No edema.      Left lower leg: No edema.   Lymphadenopathy:      Cervical: No cervical adenopathy.      Upper Body:      Right upper body: No supraclavicular, axillary or pectoral adenopathy.      Left upper body: No supraclavicular, axillary or pectoral adenopathy.   Skin:     General: Skin is warm.       Findings: No rash.   Neurological:      General: No focal deficit present.      Mental Status: She is alert and oriented to person, place, and time.      Cranial Nerves: No cranial nerve deficit.      Sensory: No sensory deficit.      Motor: No weakness.      Coordination: Coordination normal.      Gait: Gait normal.      Deep Tendon Reflexes: Reflexes are normal and symmetric. Reflexes normal.   Psychiatric:         Mood and Affect: Mood normal.         Behavior: Behavior normal.         Thought Content: Thought content normal.         Judgment: Judgment normal.       ASSESSMENT AND PLAN:   Yvonne was seen today for establish care, hypertension and cough.    Diagnoses and all orders for this visit:    Routine general medical examination at a health care facility  -     CBC With Differential With Platelet; Future  -     Assay, Thyroid Stim Hormone; Future  -     Lipid Panel; Future  -     Comp Metabolic Panel (14); Future    Encounter for screening mammogram for malignant neoplasm of breast  -     [facility] ABDIAZIZ 2D+3D SCREENING BILAT (CPT=77067/89759); Future    Screening for colon cancer  -     Gastro Referral - In Network  -     Occult Blood, Fecal, FIT Immunoassay (Colon Cancer Screen); Future    Vitamin D deficiency  -     Vitamin D; Future    Need for vaccination  -     TETANUS, DIPHTHERIA TOXOIDS AND ACELLULAR PERTUSIS VACCINE (TDAP), >7 YEARS, IM USE    Family history of thalassemia  -     Hemoglobin Electrophoresis w/Rflx Alpha,beta Chain [E]; Future    Acute cough       - advised otc robitussin or delsym, increase fluid intake       - reassured patient likely a viral infection and will run its course.    Anemia, unspecified type  -     Iron And Tibc [E]; Future  -     Ferritin [E]; Future    Herpes simplex infection of genitourinary system  -     valACYclovir 500 MG Oral Tab; 1/2 a tablet daily    Age appropriate anticipatory guidance reviewed  Health Maintenance   Topic Date Due    Annual Physical  Done today     Colorectal Cancer Screening  Never done    Mammogram  Never done    Pap Smear  02/24/2019    Zoster Vaccines (1 of 2) Never done    COVID-19 Vaccine (3 - 2023-24 season) 09/01/2023    Influenza Vaccine (Season Ended) 10/01/2024    DTaP,Tdap,and Td Vaccines (3 - Td or Tdap) 04/03/2034    Annual Depression Screening  Completed    Pneumococcal Vaccine: Birth to 64yrs  Aged Out        The 21st Century Cures Act makes medical notes like these available to patients in the interest of transparency. Please be advised this is a medical document. Medical documents are intended to carry relevant information, facts as evident, and the clinical opinion of the practitioner. The medical note is intended as peer to peer communication and may appear blunt or direct. It is written in medical language and may contain abbreviations or verbiage that are unfamiliar.

## 2024-04-15 ENCOUNTER — HOSPITAL ENCOUNTER (OUTPATIENT)
Dept: MAMMOGRAPHY | Age: 54
Discharge: HOME | End: 2024-04-15
Attending: FAMILY MEDICINE
Payer: COMMERCIAL

## 2024-04-15 DIAGNOSIS — Z12.31 ENCOUNTER FOR SCREENING MAMMOGRAM FOR MALIGNANT NEOPLASM OF BREAST: ICD-10-CM

## 2024-04-15 PROCEDURE — 77067 SCR MAMMO BI INCL CAD: CPT | Performed by: FAMILY MEDICINE

## 2024-04-15 PROCEDURE — 77063 BREAST TOMOSYNTHESIS BI: CPT | Performed by: FAMILY MEDICINE

## 2024-07-18 ENCOUNTER — NURSE TRIAGE (OUTPATIENT)
Dept: INTERNAL MEDICINE CLINIC | Facility: CLINIC | Age: 54
End: 2024-07-18

## 2024-07-18 NOTE — TELEPHONE ENCOUNTER
Pt returned call. Offered apt today with partner or juve at 11:20 with Dr. MADDOX. Pt stated she is able to come in tomorrow for apt. Scheduled visit. Pt verbalizes understanding and is agreeable to plan.     Future Appointments   Date Time Provider Department Center   7/19/2024 11:20 AM Neelam Dean MD [facility]

## 2024-07-18 NOTE — TELEPHONE ENCOUNTER
Action Requested: Summary for Provider     []  Critical Lab, Recommendations Needed  [x] Need Additional Advice  []   FYI    []   Need Orders  [] Need Medications Sent to Pharmacy  []  Other     SUMMARY: received call from pt. She was on vacation and developed urinary sxs 2 days ago. Patient has now returned home and is looking for an appointment. Patient having urinary frequency, mild dysuria, and pressure when urinating. Also having vaginal itching, denies discharge. Also denies fever/hematuria. Patient has tried monistat with no improvements. Patient stated she knows UC will not do vaginal exam, so looking for appointment with Dr. Dean. No upcoming appts. Dr. Yan does have appts today, but pt prefers seeing female/Dr. Dean.     Dr. Dean, would you be able to accommodate pt or direct to UC?     Reason for call: Acute  Onset: Data Unavailable                   Reason for Disposition   Urinating more frequently than usual (i.e., frequency)    Protocols used: Urinary Symptoms-A-[place]

## 2024-07-18 NOTE — TELEPHONE ENCOUNTER
I am not in office today, can see her at 11:20 tomorrow, can continue to push fluids, if she prefers to be seen today can see  or go to IC.

## 2024-07-19 ENCOUNTER — OFFICE VISIT (OUTPATIENT)
Dept: FAMILY MEDICINE CLINIC | Facility: CLINIC | Age: 54
End: 2024-07-19
Payer: COMMERCIAL

## 2024-07-19 VITALS
DIASTOLIC BLOOD PRESSURE: 80 MMHG | WEIGHT: 154 LBS | TEMPERATURE: 98 F | RESPIRATION RATE: 18 BRPM | BODY MASS INDEX: 25.66 KG/M2 | HEIGHT: 65 IN | SYSTOLIC BLOOD PRESSURE: 138 MMHG | HEART RATE: 80 BPM | OXYGEN SATURATION: 98 %

## 2024-07-19 DIAGNOSIS — R35.0 URINARY FREQUENCY: Primary | ICD-10-CM

## 2024-07-19 DIAGNOSIS — N89.8 VAGINAL ITCHING: ICD-10-CM

## 2024-07-19 LAB
BILIRUBIN: NEGATIVE
GLUCOSE (URINE DIPSTICK): NEGATIVE MG/DL
KETONES (URINE DIPSTICK): NEGATIVE MG/DL
MULTISTIX LOT#: ABNORMAL NUMERIC
NITRITE, URINE: POSITIVE
PH, URINE: 7 (ref 4.5–8)
PROTEIN (URINE DIPSTICK): 30 MG/DL
SPECIFIC GRAVITY: 1.02 (ref 1–1.03)
URINE-COLOR: YELLOW
UROBILINOGEN,SEMI-QN: 2 MG/DL (ref 0–1.9)

## 2024-07-19 PROCEDURE — 87186 SC STD MICRODIL/AGAR DIL: CPT | Performed by: FAMILY MEDICINE

## 2024-07-19 PROCEDURE — 87088 URINE BACTERIA CULTURE: CPT | Performed by: FAMILY MEDICINE

## 2024-07-19 PROCEDURE — 87086 URINE CULTURE/COLONY COUNT: CPT | Performed by: FAMILY MEDICINE

## 2024-07-19 RX ORDER — FLUCONAZOLE 150 MG/1
150 TABLET ORAL ONCE
Qty: 1 TABLET | Refills: 0 | Status: SHIPPED | OUTPATIENT
Start: 2024-07-19 | End: 2024-07-19

## 2024-07-19 RX ORDER — CIPROFLOXACIN 500 MG/1
500 TABLET, FILM COATED ORAL 2 TIMES DAILY
Qty: 20 TABLET | Refills: 0 | Status: SHIPPED | OUTPATIENT
Start: 2024-07-19 | End: 2024-07-29

## 2024-07-19 NOTE — PROGRESS NOTES
Yvonne Washburn is a 53 year old female.  Chief Complaint   Patient presents with    Dysuria    Urinary Frequency     HPI:   Yvonne Washburn is a 53 year old female complaining of urinary frequency for the past week, states started when she travelled to [place] on Thursday a week ago. Denies pain but had some pressure in her bladder area, also had some itching in the vaginal area and used over the counter monistat with no relief.  Denies any fever, back pain or blood in urine.    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   Tobacco Use    Smoking status: Never    Smokeless tobacco: Never    Tobacco comments:     nonsmoker   Vaping Use    Vaping status: Never Used   Substance and Sexual Activity    Alcohol use: Not Currently     Alcohol/week: 10.0 standard drinks of alcohol     Types: 10 Standard drinks or equivalent per week     Comment: twice a year    Drug use: No    Sexual activity: Never     Partners: Male   Other Topics Concern    Caffeine Concern No    Stress Concern No    Weight Concern No    Special Diet No    Exercise No    Seat Belt Yes   Social History Narrative    Balanced diet sometimes, no exercise        REVIEW OF SYSTEMS:   A comprehensive 10 point review of systems was completed.  Pertinent positives and negatives noted in the the HPI.      EXAM:   /80   Pulse 80   Temp 98 °F (36.7 °C) (Temporal)   Resp 18   Ht 5' 5\" (1.651 m)   Wt 154 lb (69.9 kg)   LMP 04/09/2024 (Exact Date)   SpO2 98%   BMI 25.63 kg/m²    GENERAL: well developed, well nourished,in no apparent distress  LUNGS: clear to auscultation  CARDIO: RRR without murmur  ABDOMEN: soft, mild supra pubic tenderness, no CVAT    ASSESSMENT AND PLAN:   Hope was seen today for dysuria and urinary frequency.    Diagnoses and all orders for this visit:    Urinary frequency  -     Urine Dip, auto without Micro  -     Urine Culture, Routine [E]; Future  -     Urine Culture, Routine [E]  -     ciprofloxacin 500 MG Oral Tab; Take 1 tablet (500 mg total) by mouth 2 (two) times daily for 10 days.  -      encouraged to push fluids    Vaginal itching  -     fluconazole 150 MG Oral Tab; Take 1 tablet (150 mg total) by mouth once for 1 dose.       The 21st Century Cures Act makes medical notes like these available to patients in the interest of transparency. Please be advised this is a medical document. Medical documents are intended to carry relevant information, facts as evident, and the clinical opinion of the practitioner. The medical note is intended as peer to peer communication and may appear blunt or direct. It is written in medical language and may contain abbreviations or verbiage that are unfamiliar.

## 2024-08-28 ENCOUNTER — OFFICE VISIT (OUTPATIENT)
Dept: FAMILY MEDICINE CLINIC | Facility: CLINIC | Age: 54
End: 2024-08-28
Payer: COMMERCIAL

## 2024-08-28 VITALS
OXYGEN SATURATION: 100 % | RESPIRATION RATE: 16 BRPM | HEIGHT: 65 IN | DIASTOLIC BLOOD PRESSURE: 62 MMHG | WEIGHT: 155 LBS | SYSTOLIC BLOOD PRESSURE: 148 MMHG | HEART RATE: 80 BPM | BODY MASS INDEX: 25.83 KG/M2 | TEMPERATURE: 98 F

## 2024-08-28 DIAGNOSIS — R39.9 UTI SYMPTOMS: Primary | ICD-10-CM

## 2024-08-28 LAB
APPEARANCE: CLEAR
BILIRUBIN: NEGATIVE
GLUCOSE (URINE DIPSTICK): NEGATIVE MG/DL
KETONES (URINE DIPSTICK): NEGATIVE MG/DL
MULTISTIX LOT#: ABNORMAL NUMERIC
NITRITE, URINE: NEGATIVE
OCCULT BLOOD: NEGATIVE
PH, URINE: 7 (ref 4.5–8)
PROTEIN (URINE DIPSTICK): NEGATIVE MG/DL
SPECIFIC GRAVITY: 1.02 (ref 1–1.03)
URINE-COLOR: YELLOW
UROBILINOGEN,SEMI-QN: 1 MG/DL (ref 0–1.9)

## 2024-08-28 PROCEDURE — 99213 OFFICE O/P EST LOW 20 MIN: CPT | Performed by: NURSE PRACTITIONER

## 2024-08-28 PROCEDURE — 3078F DIAST BP <80 MM HG: CPT | Performed by: NURSE PRACTITIONER

## 2024-08-28 PROCEDURE — 3008F BODY MASS INDEX DOCD: CPT | Performed by: NURSE PRACTITIONER

## 2024-08-28 PROCEDURE — 87086 URINE CULTURE/COLONY COUNT: CPT | Performed by: NURSE PRACTITIONER

## 2024-08-28 PROCEDURE — 81003 URINALYSIS AUTO W/O SCOPE: CPT | Performed by: NURSE PRACTITIONER

## 2024-08-28 PROCEDURE — 3077F SYST BP >= 140 MM HG: CPT | Performed by: NURSE PRACTITIONER

## 2024-08-28 RX ORDER — NITROFURANTOIN 25; 75 MG/1; MG/1
100 CAPSULE ORAL 2 TIMES DAILY
Qty: 10 CAPSULE | Refills: 0 | Status: SHIPPED | OUTPATIENT
Start: 2024-08-28 | End: 2024-09-02

## 2024-08-28 NOTE — PROGRESS NOTES
CHIEF COMPLAINT:     Chief Complaint   Patient presents with    UTI     3 days, frequency, mild itching, denies discharge, no otc       HPI:   Yvonne Washburn is a 53 year old female who presents with symptoms of UTI. Complaining of urinary frequency, urgency for last 3 days. Symptoms have been worsening since onset.  Treatments tried: none.  Associated symptoms: mild vaginal itching. No discharge.   Denies flank pain, fever, hematuria, nausea, or vomiting.  Denies vaginal discharge, new sexual partners in the last 3 months, or recent unprotected sexual intercourse.   Other  hx: history of UTI feels similar  Hx of pyelonephritis:no  Hx of kidney stone: no    Current Outpatient Medications   Medication Sig Dispense Refill    nitrofurantoin monohydrate macro (MACROBID) 100 MG Oral Cap Take 1 capsule (100 mg total) by mouth 2 (two) times daily for 5 days. 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   Tobacco Use    Smoking status: Never    Smokeless tobacco: Never    Tobacco comments:     nonsmoker   Vaping Use    Vaping status: Never Used   Substance and Sexual Activity    Alcohol use: Not Currently     Alcohol/week: 10.0 standard drinks of alcohol     Types: 10 Standard drinks or equivalent per week     Comment: twice a year    Drug use: No    Sexual activity: Never     Partners: Male   Other Topics Concern    Caffeine Concern No    Stress Concern No    Weight Concern No    Special Diet No    Exercise No    Seat Belt Yes   Social History Narrative    Balanced diet sometimes, no exercise         REVIEW OF SYSTEMS:   GENERAL: Denies fever,  chills, or body aches; good appetite  SKIN: no rashes, no skin wounds or ulcers.  EYES:denies blurred vision or double vision  HEENT: no congestion, rhinorrhea, sore throat or ear pain  CARDIOVASCULAR: denies chest pain or palpitations  LUNGS: denies shortness of breath, cough, or wheezing  GI: See HPI. No N/V/C/D.   : See HPI.  NEURO: no headaches.    EXAM:   /62   Pulse 80   Temp 98.4 °F (36.9 °C)   Resp 16   Ht 5' 5\" (1.651 m)   Wt 155 lb (70.3 kg)   LMP 08/19/2024 (Exact Date)   SpO2 100%   BMI 25.79 kg/m²   GENERAL: well developed, well nourished,in no apparent distress  CARDIO: RRR, no murmurs  LUNGS: clear to ausculation bilaterally, no wheezing or rhonchi  GI: BS present x 4.  No hepatosplenomegaly, no tenderness  : no suprapubic tenderness, bladder distention, or CVAT   EXTREMITIES: no edema    Recent Results (from the past 24 hour(s))   URINALYSIS, AUTO, W/O SCOPE    Collection Time: 08/28/24  1:13 PM   Result Value Ref Range    Glucose Urine Negative Negative mg/dL    Bilirubin Urine Negative Negative    Ketones, UA Negative Negative - Trace mg/dL    Spec Gravity 1.025 1.005 - 1.030    Blood Urine Negative Negative    PH Urine 7.0 5.0 - 8.0    Protein Urine Negative Negative - Trace mg/dL    Urobilinogen Urine 1.0 0.2 - 1.0 mg/dL    Nitrite Urine Negative Negative    Leukocyte Esterase Urine Trace (A) Negative    APPEARANCE clear Clear    Color Urine yellow Yellow    Multistix Lot# [id number] Numeric    Multistix Expiration Date 6/30/25 Date         ASSESSMENT AND PLAN:   Yvonne Washburn is a 53 year old female presents with UTI symptoms.    ASSESSMENT:  Encounter Diagnosis   Name Primary?    UTI symptoms Yes     1. UTI symptoms  - Urine Culture, Routine; Future  - URINALYSIS, AUTO, W/O SCOPE  - Urine Culture, Routine  - nitrofurantoin monohydrate macro (MACROBID) 100 MG Oral Cap; Take 1 capsule (100 mg total) by mouth 2 (two) times daily for 5 days.  Dispense: 10 capsule; Refill:  0      PLAN: Meds as listed below.  Urine sent for culture. Comfort measures as described in Patient Instructions    Meds & Refills for this Visit:  Requested Prescriptions     Signed Prescriptions Disp Refills    nitrofurantoin monohydrate macro (MACROBID) 100 MG Oral Cap 10 capsule 0     Sig: Take 1 capsule (100 mg total) by mouth 2 (two) times daily for 5 days.       Risk and benefits of medication discussed. Stressed importance of completing full course of antibiotic.     See pt handout      The patient indicates understanding of these issues and agrees to the plan.  The patient is asked to follow up with PCP in 3-5 days if not better. Call if fever, vomiting, worsening symptoms.

## 2024-11-25 ENCOUNTER — NURSE TRIAGE (OUTPATIENT)
Dept: FAMILY MEDICINE CLINIC | Facility: CLINIC | Age: 54
End: 2024-11-25

## 2024-11-25 NOTE — TELEPHONE ENCOUNTER
Action Requested: Summary for Provider     []  Critical Lab, Recommendations Needed  [] Need Additional Advice  []   FYI    []   Need Orders  [] Need Medications Sent to Pharmacy  []  Other     SUMMARY: Scheduled OV next week. Provided UC/ER warning signs. Advised to keep track of spotting and call with any worsening sxs  Future Appointments   Date Time Provider Department Center   12/2/2024 10:00 AM Neelam Dean MD [facility]     Reason for call: No chief complaint on file.  Onset: 3 days   Reason for Disposition   Age > 41 years with irregular or excessive bleeding    Protocols used: Vaginal Bleeding - [id number]      Spotting now for 3 days  Small amount of blood, some clots. Here and there. Not soaking pads or tampons  Denies abd pain and cramping   Had her normal period 2 weeks ago. Never had spotting before  Last 2 periods have been heavier  Period typically lasts 5 days   No urinary sxs. Not sexually active.   Scheduled OV. Provided IC/ER warnings. Call with worsening sxs

## 2024-12-02 ENCOUNTER — OFFICE VISIT (OUTPATIENT)
Dept: FAMILY MEDICINE CLINIC | Facility: CLINIC | Age: 54
End: 2024-12-02
Payer: COMMERCIAL

## 2024-12-02 ENCOUNTER — LAB ENCOUNTER (OUTPATIENT)
Dept: LAB | Age: 54
End: 2024-12-02
Attending: FAMILY MEDICINE
Payer: COMMERCIAL

## 2024-12-02 VITALS
DIASTOLIC BLOOD PRESSURE: 64 MMHG | TEMPERATURE: 98 F | HEIGHT: 65 IN | OXYGEN SATURATION: 98 % | SYSTOLIC BLOOD PRESSURE: 108 MMHG | RESPIRATION RATE: 18 BRPM | BODY MASS INDEX: 25.49 KG/M2 | HEART RATE: 68 BPM | WEIGHT: 153 LBS

## 2024-12-02 DIAGNOSIS — N93.8 DUB (DYSFUNCTIONAL UTERINE BLEEDING): Primary | ICD-10-CM

## 2024-12-02 DIAGNOSIS — Z00.00 ROUTINE GENERAL MEDICAL EXAMINATION AT A HEALTH CARE FACILITY: ICD-10-CM

## 2024-12-02 DIAGNOSIS — N95.1 PERI-MENOPAUSAL: ICD-10-CM

## 2024-12-02 DIAGNOSIS — Z83.2 FAMILY HISTORY OF THALASSEMIA: ICD-10-CM

## 2024-12-02 DIAGNOSIS — D64.9 ANEMIA, UNSPECIFIED TYPE: ICD-10-CM

## 2024-12-02 DIAGNOSIS — E55.9 VITAMIN D DEFICIENCY: ICD-10-CM

## 2024-12-02 DIAGNOSIS — R39.15 URINARY URGENCY: ICD-10-CM

## 2024-12-02 LAB
ALBUMIN SERPL-MCNC: 4.4 G/DL (ref 3.2–4.8)
ALBUMIN/GLOB SERPL: 1.3 {RATIO} (ref 1–2)
ALP LIVER SERPL-CCNC: 93 U/L
ALT SERPL-CCNC: <7 U/L
ANION GAP SERPL CALC-SCNC: 3 MMOL/L (ref 0–18)
AST SERPL-CCNC: 18 U/L (ref ?–34)
BASOPHILS # BLD AUTO: 0.02 X10(3) UL (ref 0–0.2)
BASOPHILS NFR BLD AUTO: 0.5 %
BILIRUB SERPL-MCNC: 0.8 MG/DL (ref 0.3–1.2)
BILIRUBIN: NEGATIVE
BUN BLD-MCNC: 9 MG/DL (ref 9–23)
CALCIUM BLD-MCNC: 9.5 MG/DL (ref 8.7–10.4)
CHLORIDE SERPL-SCNC: 107 MMOL/L (ref 98–112)
CHOLEST SERPL-MCNC: 96 MG/DL (ref ?–200)
CO2 SERPL-SCNC: 26 MMOL/L (ref 21–32)
CREAT BLD-MCNC: 0.51 MG/DL
DEPRECATED HBV CORE AB SER IA-ACNC: 2 NG/ML
EGFRCR SERPLBLD CKD-EPI 2021: 112 ML/MIN/1.73M2 (ref 60–?)
EOSINOPHIL # BLD AUTO: 0.15 X10(3) UL (ref 0–0.7)
EOSINOPHIL NFR BLD AUTO: 3.9 %
ERYTHROCYTE [DISTWIDTH] IN BLOOD BY AUTOMATED COUNT: 27.4 %
FASTING PATIENT LIPID ANSWER: YES
FASTING STATUS PATIENT QL REPORTED: YES
GLOBULIN PLAS-MCNC: 3.3 G/DL (ref 2–3.5)
GLUCOSE (URINE DIPSTICK): NEGATIVE MG/DL
GLUCOSE BLD-MCNC: 91 MG/DL (ref 70–99)
HCT VFR BLD AUTO: 16.5 %
HDLC SERPL-MCNC: 34 MG/DL (ref 40–59)
HGB BLD-MCNC: 4.1 G/DL
IMM GRANULOCYTES # BLD AUTO: 0.01 X10(3) UL (ref 0–1)
IMM GRANULOCYTES NFR BLD: 0.3 %
IRON SATN MFR SERPL: 1 %
IRON SERPL-MCNC: 6 UG/DL
KETONES (URINE DIPSTICK): NEGATIVE MG/DL
LDLC SERPL CALC-MCNC: 51 MG/DL (ref ?–100)
LYMPHOCYTES # BLD AUTO: 1.81 X10(3) UL (ref 1–4)
LYMPHOCYTES NFR BLD AUTO: 46.6 %
MCH RBC QN AUTO: 14.5 PG (ref 26–34)
MCHC RBC AUTO-ENTMCNC: 24.8 G/DL (ref 31–37)
MCV RBC AUTO: 58.5 FL
MONOCYTES # BLD AUTO: 0.36 X10(3) UL (ref 0.1–1)
MONOCYTES NFR BLD AUTO: 9.3 %
MULTISTIX LOT#: ABNORMAL NUMERIC
NEUTROPHILS # BLD AUTO: 1.53 X10 (3) UL (ref 1.5–7.7)
NEUTROPHILS # BLD AUTO: 1.53 X10(3) UL (ref 1.5–7.7)
NEUTROPHILS NFR BLD AUTO: 39.4 %
NITRITE, URINE: NEGATIVE
NONHDLC SERPL-MCNC: 62 MG/DL (ref ?–130)
OCCULT BLOOD: NEGATIVE
OSMOLALITY SERPL CALC.SUM OF ELEC: 280 MOSM/KG (ref 275–295)
PH, URINE: 6 (ref 4.5–8)
PLATELET # BLD AUTO: 565 10(3)UL (ref 150–450)
PLATELET MORPHOLOGY: NORMAL
POTASSIUM SERPL-SCNC: 3.4 MMOL/L (ref 3.5–5.1)
PROT SERPL-MCNC: 7.7 G/DL (ref 5.7–8.2)
PROTEIN (URINE DIPSTICK): 100 MG/DL
RBC # BLD AUTO: 2.82 X10(6)UL
SODIUM SERPL-SCNC: 136 MMOL/L (ref 136–145)
SPECIFIC GRAVITY: 1.02 (ref 1–1.03)
TOTAL IRON BINDING CAPACITY: 452 UG/DL (ref 250–425)
TRANSFERRIN SERPL-MCNC: 385 MG/DL (ref 250–380)
TRIGL SERPL-MCNC: 38 MG/DL (ref 30–149)
TSI SER-ACNC: 1.25 UIU/ML (ref 0.55–4.78)
URINE-COLOR: YELLOW
UROBILINOGEN,SEMI-QN: 0.2 MG/DL (ref 0–1.9)
VIT D+METAB SERPL-MCNC: 10.9 NG/ML (ref 30–100)
VLDLC SERPL CALC-MCNC: 5 MG/DL (ref 0–30)
WBC # BLD AUTO: 3.9 X10(3) UL (ref 4–11)

## 2024-12-02 PROCEDURE — 85025 COMPLETE CBC W/AUTO DIFF WBC: CPT

## 2024-12-02 PROCEDURE — 83540 ASSAY OF IRON: CPT

## 2024-12-02 PROCEDURE — 87088 URINE BACTERIA CULTURE: CPT | Performed by: FAMILY MEDICINE

## 2024-12-02 PROCEDURE — 87086 URINE CULTURE/COLONY COUNT: CPT | Performed by: FAMILY MEDICINE

## 2024-12-02 PROCEDURE — 87186 SC STD MICRODIL/AGAR DIL: CPT | Performed by: FAMILY MEDICINE

## 2024-12-02 PROCEDURE — 82306 VITAMIN D 25 HYDROXY: CPT

## 2024-12-02 PROCEDURE — 83021 HEMOGLOBIN CHROMOTOGRAPHY: CPT

## 2024-12-02 PROCEDURE — 80053 COMPREHEN METABOLIC PANEL: CPT

## 2024-12-02 PROCEDURE — 82728 ASSAY OF FERRITIN: CPT

## 2024-12-02 PROCEDURE — 84443 ASSAY THYROID STIM HORMONE: CPT

## 2024-12-02 PROCEDURE — 36415 COLL VENOUS BLD VENIPUNCTURE: CPT

## 2024-12-02 PROCEDURE — 83020 HEMOGLOBIN ELECTROPHORESIS: CPT

## 2024-12-02 PROCEDURE — 83550 IRON BINDING TEST: CPT

## 2024-12-02 PROCEDURE — 80061 LIPID PANEL: CPT

## 2024-12-02 NOTE — PROGRESS NOTES
Family Medicine Progress Note  ASSESSMENT AND PLAN:  Yvonne Washburn is a 54 year old female who is here for:     Yvonne was seen today for vaginal problem.    Diagnoses and all orders for this visit:    DUB (dysfunctional uterine bleeding)  -     US PELVIS (TRANSABDOMINAL AND TRANSVAGINAL) (CPT=76856/50828); Future         - reassured patient likely due to perimenopause, will check pelvic US to rule out any endometrial hyperplasia or fibroids.    Ama-menopausal         - symptoms discussed with patient.    Urinary urgency  -     Urine Dip, auto without Micro-- trace LE  -     Urine Culture, Routine [E]; Future        The patient indicates understanding of these issues and agrees to the plan.  Follow-Up: The patient is asked to return in Return for pap.  .     Neelam Dean MD   12/02/24      CC: Vaginal Problem (Spotting between period. Only happened this month)    HPI:   Yvonne Washburn is a 54 year old female who presents for Vaginal Problem (Spotting between period. Only happened this month)     Patient complaining of irregular menstrual bleeding, states her LMP was 11/12/24, lasted 5 days and then started spotting again on the 23rd for 3 days and then flow for 1 day. Denies any pelvic pain or cramping.  Periods are usually very heavy for 3 days, changes every 2 hrs and then can see clots for a couple of days.   Has history of anemia due to thalassemia and iron deficiency, not taking any iron currently.    Urinary urgency for a while, no pain, states urine looked a little cloudy today.    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   Tobacco Use    Smoking status: Never    Smokeless tobacco: Never    Tobacco comments:     nonsmoker   Vaping Use    Vaping status: Never Used   Substance and Sexual Activity    Alcohol use: Not Currently     Alcohol/week: 10.0 standard drinks of alcohol     Types: 10 Standard drinks or equivalent per week     Comment: twice a year    Drug use: No    Sexual activity: Never     Partners: Male   Other Topics Concern    Caffeine Concern No    Stress Concern No    Weight Concern No    Special Diet No    Exercise No    Seat Belt Yes   Social History Narrative    Balanced diet sometimes, no exercise        REVIEW OF SYSTEMS:   A comprehensive 10 point review of systems was completed.  Pertinent positives and negatives noted in the the HPI.      EXAM:   /64   Pulse 68   Temp 98 °F (36.7 °C) (Temporal)   Resp 18   Ht 5' 5\" (1.651 m)   Wt 153 lb (69.4 kg)   LMP 11/12/2024 (Exact Date)   SpO2 98%   BMI 25.46 kg/m²   GENERAL: well developed, well nourished,in no apparent distress  NECK: supple,no thyromegaly  LUNGS: clear to auscultation  CARDIO: RRR without murmur  ABDOMEN: soft, non tender  GYN: deferred    NOTE TO PATIENT: The 21st Century Cures Act makes clinical notes like these available to patients in the interest of transparency. Clinical notes are medical documents used by physicians and care providers to communicate with each other. These documents include medical language and terminology, abbreviations, and treatment information that may sound technical and at times possibly unfamiliar. In addition, at times, the verbiage may appear blunt or direct. These documents are one tool providers use to communicate relevant information and clinical opinions of the care providers in a way that allows common understanding of the clinical context.      Neelam Dean,  MD    12/02/24 10:18 AM

## 2024-12-03 ENCOUNTER — HOSPITAL ENCOUNTER (INPATIENT)
Facility: HOSPITAL | Age: 54
LOS: 1 days | Discharge: HOME | End: 2024-12-04
Attending: EMERGENCY MEDICINE | Admitting: HOSPITALIST
Payer: COMMERCIAL

## 2024-12-03 DIAGNOSIS — D64.9 SEVERE ANEMIA: Primary | ICD-10-CM

## 2024-12-03 LAB
ALBUMIN SERPL-MCNC: 4.3 G/DL (ref 3.2–4.8)
ALBUMIN/GLOB SERPL: 1.5 {RATIO} (ref 1–2)
ALP LIVER SERPL-CCNC: 94 U/L
ALT SERPL-CCNC: <7 U/L
ANION GAP SERPL CALC-SCNC: 7 MMOL/L (ref 0–18)
ANTIBODY SCREEN: NEGATIVE
APTT PPP: 23.1 SECONDS (ref 23–36)
AST SERPL-CCNC: 17 U/L (ref ?–34)
ATRIAL RATE: 77 BPM
B-HCG UR QL: NEGATIVE
BASOPHILS # BLD AUTO: 0.03 X10(3) UL (ref 0–0.2)
BASOPHILS NFR BLD AUTO: 0.8 %
BILIRUB SERPL-MCNC: 0.8 MG/DL (ref 0.3–1.2)
BUN BLD-MCNC: 9 MG/DL (ref 9–23)
CALCIUM BLD-MCNC: 9.1 MG/DL (ref 8.7–10.4)
CHLORIDE SERPL-SCNC: 107 MMOL/L (ref 98–112)
CO2 SERPL-SCNC: 28 MMOL/L (ref 21–32)
CREAT BLD-MCNC: 0.55 MG/DL
EGFRCR SERPLBLD CKD-EPI 2021: 110 ML/MIN/1.73M2 (ref 60–?)
EOSINOPHIL # BLD AUTO: 0.15 X10(3) UL (ref 0–0.7)
EOSINOPHIL NFR BLD AUTO: 3.9 %
ERYTHROCYTE [DISTWIDTH] IN BLOOD BY AUTOMATED COUNT: 27 %
GLOBULIN PLAS-MCNC: 2.9 G/DL (ref 2–3.5)
GLUCOSE BLD-MCNC: 96 MG/DL (ref 70–99)
HCT VFR BLD AUTO: 15.8 %
HGB BLD-MCNC: 4 G/DL
HGB BLD-MCNC: 6 G/DL
IMM GRANULOCYTES # BLD AUTO: 0.01 X10(3) UL (ref 0–1)
IMM GRANULOCYTES NFR BLD: 0.3 %
INR BLD: 1.12 (ref 0.8–1.2)
LYMPHOCYTES # BLD AUTO: 1.99 X10(3) UL (ref 1–4)
LYMPHOCYTES NFR BLD AUTO: 52.4 %
MCH RBC QN AUTO: 14.4 PG (ref 26–34)
MCHC RBC AUTO-ENTMCNC: 25.3 G/DL (ref 31–37)
MCV RBC AUTO: 56.8 FL
MONOCYTES # BLD AUTO: 0.38 X10(3) UL (ref 0.1–1)
MONOCYTES NFR BLD AUTO: 10 %
NEUTROPHILS # BLD AUTO: 1.24 X10 (3) UL (ref 1.5–7.7)
NEUTROPHILS # BLD AUTO: 1.24 X10(3) UL (ref 1.5–7.7)
NEUTROPHILS NFR BLD AUTO: 32.6 %
OSMOLALITY SERPL CALC.SUM OF ELEC: 293 MOSM/KG (ref 275–295)
P AXIS: 27 DEGREES
P-R INTERVAL: 150 MS
PLATELET # BLD AUTO: 483 10(3)UL (ref 150–450)
POTASSIUM SERPL-SCNC: 3.4 MMOL/L (ref 3.5–5.1)
POTASSIUM SERPL-SCNC: 4.2 MMOL/L (ref 3.5–5.1)
PROT SERPL-MCNC: 7.2 G/DL (ref 5.7–8.2)
PROTHROMBIN TIME: 14.6 SECONDS (ref 11.6–14.8)
Q-T INTERVAL: 412 MS
QRS DURATION: 82 MS
QTC CALCULATION (BEZET): 466 MS
R AXIS: 34 DEGREES
RBC # BLD AUTO: 2.78 X10(6)UL
RH BLOOD TYPE: POSITIVE
SODIUM SERPL-SCNC: 142 MMOL/L (ref 136–145)
T AXIS: 42 DEGREES
VENTRICULAR RATE: 77 BPM
WBC # BLD AUTO: 3.8 X10(3) UL (ref 4–11)

## 2024-12-03 PROCEDURE — 30233N1 TRANSFUSION OF NONAUTOLOGOUS RED BLOOD CELLS INTO PERIPHERAL VEIN, PERCUTANEOUS APPROACH: ICD-10-PCS | Performed by: STUDENT IN AN ORGANIZED HEALTH CARE EDUCATION/TRAINING PROGRAM

## 2024-12-03 PROCEDURE — 99222 1ST HOSP IP/OBS MODERATE 55: CPT | Performed by: HOSPITALIST

## 2024-12-03 RX ORDER — PROCHLORPERAZINE EDISYLATE 5 MG/ML
5 INJECTION INTRAMUSCULAR; INTRAVENOUS EVERY 8 HOURS PRN
Status: DISCONTINUED | OUTPATIENT
Start: 2024-12-03 | End: 2024-12-04

## 2024-12-03 RX ORDER — BISACODYL 10 MG
10 SUPPOSITORY, RECTAL RECTAL
Status: DISCONTINUED | OUTPATIENT
Start: 2024-12-03 | End: 2024-12-04

## 2024-12-03 RX ORDER — POLYETHYLENE GLYCOL 3350 17 G/17G
17 POWDER, FOR SOLUTION ORAL DAILY PRN
Status: DISCONTINUED | OUTPATIENT
Start: 2024-12-03 | End: 2024-12-04

## 2024-12-03 RX ORDER — POTASSIUM CHLORIDE 1500 MG/1
40 TABLET, EXTENDED RELEASE ORAL EVERY 4 HOURS
Status: COMPLETED | OUTPATIENT
Start: 2024-12-03 | End: 2024-12-03

## 2024-12-03 RX ORDER — SENNOSIDES 8.6 MG
17.2 TABLET ORAL NIGHTLY PRN
Status: DISCONTINUED | OUTPATIENT
Start: 2024-12-03 | End: 2024-12-04

## 2024-12-03 RX ORDER — SODIUM PHOSPHATE, DIBASIC AND SODIUM PHOSPHATE, MONOBASIC 7; 19 G/230ML; G/230ML
1 ENEMA RECTAL ONCE AS NEEDED
Status: DISCONTINUED | OUTPATIENT
Start: 2024-12-03 | End: 2024-12-04

## 2024-12-03 RX ORDER — ONDANSETRON 2 MG/ML
4 INJECTION INTRAMUSCULAR; INTRAVENOUS EVERY 6 HOURS PRN
Status: DISCONTINUED | OUTPATIENT
Start: 2024-12-03 | End: 2024-12-04

## 2024-12-03 NOTE — ED QUICK NOTES
Orders for admission, patient is aware of plan and ready to go upstairs. Any questions, please call ED RN Yobani at extension [phone].     Patient Covid vaccination status: Fully vaccinated     COVID Test Ordered in ED: None    COVID Suspicion at Admission: N/A    Running Infusions:  None    Mental Status/LOC at time of transport: Alert    Other pertinent information:   CIWA score: N/A   NIH score:  N/A

## 2024-12-03 NOTE — ED INITIAL ASSESSMENT (HPI)
Pt here with c/o of low HGB. Pt states she went to MD office yesterday and had labs drawn and was told HGB was told 4.1. Pt states she has been fatigue x 1 week. Pt denies SOB, dizziness, and CP.

## 2024-12-03 NOTE — CONSULTS
[facility]   part of [facility]    Non-Surgical Consult    Yvonne Washburn Patient Status:  Inpatient    1970 MRN [id number]   Location [facility] 4NW-A Attending Anabell Marcial MD   Hosp Day # 0 PCP Neelam Dean MD       Date of Admission:  12/3/2024    SUBJECTIVE:    Reason for Admission:  Severe anemia    History of Present Illness:  Patient is a 54 year old female.    Patient's last menstrual period was 2024 (exact date).  But a long standing history of abnormal bleeding and menorrhagia. Patient was last seen in  2022 and an IUD versus hysterectomy and endometrial ablation were discussed and the patient did not return    Medications:  Oral contraceptives until definitive action was taken    Allergies:  NKA  Past Medical History:  Past Medical History:    Anemia, unspecified    Essential hypertension, benign    Genital herpes    High blood pressure    History of pregnancy    childbirth x 5       Past Surgical History:  Past Surgical History:   Procedure Laterality Date             Past OB History:  OB History    Para Term  AB Living   5 5           SAB IAB Ectopic Multiple Live Births                  # Outcome Date GA Lbr John/2nd Weight Sex Type Anes PTL Lv   5 Para            4 Para            3 Para            2 Para            1 Para                Past GYN History:   irregular periods    Social History:  Social History     Tobacco Use    Smoking status: Never    Smokeless tobacco: Never    Tobacco comments:     nonsmoker   Substance Use Topics    Alcohol use: Not Currently     Alcohol/week: 10.0 standard drinks of alcohol     Types: 10 Standard drinks or equivalent per week     Comment: twice a year        Review of Systems:  normal menses, no abnormal bleeding, pelvic pain or discharge and no breast pain or new or enlarging lumps on self exam    OBJECTIVE:  Temp:  [98 °F (36.7 °C)-98.3 °F (36.8 °C)] 98 °F (36.7 °C)  Pulse:  [63-84]  72  Resp:  [12-20] 20  BP: (129-149)/(57-82) 129/60  SpO2:  [100 %] 100 %    Intake/Output Summary (Last 24 hours) at 12/3/2024 1332  Last data filed at 12/3/2024 1229  Gross per 24 hour   Intake 690.83 ml   Output --   Net 690.83 ml       Physical Exam:  General appearance: alert, appears stated age, and cooperative  Head: Normocephalic, without obvious abnormality, atraumatic  Eyes: conjunctivae/corneas clear. PERRL, EOM's intact. Fundi benign.  Ears: normal TM's and external ear canals both ears  Nose: Nares normal. Septum midline. Mucosa normal. No drainage or sinus tenderness.  Throat: normal findings: lips normal without lesions  Neck: no adenopathy, no carotid bruit, no JVD, supple, symmetrical, trachea midline, and thyroid not enlarged, symmetric, no tenderness/mass/nodules  Back: symmetric, no curvature. ROM normal. No CVA tenderness.  Lungs: clear to auscultation bilaterally  Breasts:  normal appearance, no masses or tenderness  Heart: S1, S2 normal, no murmur, click, rub or gallop, regular rate and rhythm  Abdomen: soft, non-tender; bowel sounds normal; no masses,  no organomegaly  Pelvic: deferred  Extremities: extremities normal, atraumatic, no cyanosis or edema  Pulses: Left Pulses: FEM: present 2+, POP: present 2+, DP: present 2+, PT: equal  Skin: Skin color, texture, turgor normal. No rashes or lesions or normal  Lymph nodes: Cervical, supraclavicular, and axillary nodes normal.  Neurologic: Grossly normal    Diagnostics see below    Data Review:   Lab Results   Component Value Date    WBC 3.8 12/03/2024    HGB 4.0 12/03/2024    HCT 15.8 12/03/2024    .0 12/03/2024    CREATSERUM 0.55 12/03/2024    BUN 9 12/03/2024     12/03/2024    K 3.4 12/03/2024     12/03/2024    CO2 28.0 12/03/2024    GLU 96 12/03/2024    CA 9.1 12/03/2024    ALB 4.3 12/03/2024    ALKPHO 94 12/03/2024    BILT 0.8 12/03/2024    TP 7.2 12/03/2024    AST 17 12/03/2024    ALT <7 12/03/2024    PTT 23.1 12/03/2024     INR 1.12 12/03/2024       ASSESSMENT:  AUB with secondary severe anemia    PLAN:  Transfuse then patient may be discharged and follow-up with me in 48 hours for an endometrial evaluation    All of the findings and plan were discussed with the patient.  She notes understanding and agrees with the plan of care.  All questions were answered to the best of my ability at this time.    Mike Jose MD  12/3/2024  1:32 PM

## 2024-12-03 NOTE — H&P
[facility]  [facility]     Yvonne Washburn Patient Status:  Inpatient    1970 MRN [id number]   Location [facility] 4NW-A Attending Anabell Marcial MD   Hosp Day # 0 PCP Neelam Dean MD     Chief Complaint: Dizziness chest pain shortness of breath anemia    Subjective:    History of Present Illness:     Yvonne Washburn is a 54 year old female with history of lifelong anemia who was told about 20 years ago that she needs a hysterectomy because she had heavy vaginal bleeding episodes.  Patient was seen by her primary care physician yesterday for the above symptoms and labs were checked.  Her hemoglobin is 4.1.  Patient states that she has been having worse bleeding during the past 6 months with 2.  This month.  She has not seen her GYN in over a year.  Her hemoglobin was 6 about 8 years ago.  Pelvic ultrasound is pending.  Patient denies any hematemesis melena seeing any blood in the stool.  Her last menstrual period was .  Lasted 5 days and then started again on the  for the 3 days with heavy flow and passing clots.  She denies any abdominal pain.  Patient has 5 children the youngest is 15 years old.  She also has history of thalassemia and iron deficiency.    History/Other:    Past Medical History:  Past Medical History:    Anemia, unspecified    Essential hypertension, benign    Genital herpes    High blood pressure    History of pregnancy    childbirth x 5     Past Surgical History:   Past Surgical History:   Procedure Laterality Date            Family History:   Family History   Problem Relation Age of Onset    Hypertension Mother     Anemia Mother     Stroke Mother     Other (Other[other]) Maternal Grandmother         thalasemia trait    Other (Other[other]) Daughter         thallasemia trait    Anemia Son     Other (Other) Other         family history of Thalassemia     Social History:    reports that she has never smoked. She has never used smokeless  tobacco. She reports that she does not currently use alcohol after a past usage of about 10.0 standard drinks of alcohol per week. She reports that she does not use drugs.     Allergies: Allergies[1]    Medications:  Medications Ordered Prior to Encounter[2]    Review of Systems:   A comprehensive review of systems was completed.    Pertinent positives and negatives noted in the HPI.    Objective:   Physical Exam:    /59 (BP Location: Left arm)   Pulse 67   Temp 98.1 °F (36.7 °C) (Oral)   Resp 20   Ht 5' 5\" (1.651 m)   Wt 152 lb (68.9 kg)   LMP 11/12/2024 (Exact Date)   SpO2 100%   BMI 25.29 kg/m²   General: No acute distress, Alert  Respiratory: No rhonchi, no wheezes  Cardiovascular: S1, S2. Regular rate and rhythm  Abdomen: Soft, Non-tender, non-distended, positive bowel sounds  Neuro: No new focal deficits  Extremities: No edema      Results:    Labs:      Labs Last 24 Hours:    Recent Labs   Lab 12/02/24  1100 12/03/24  0736   RBC 2.82* 2.78*   HGB 4.1* 4.0*   HCT 16.5* 15.8*   MCV 58.5* 56.8*   MCH 14.5* 14.4*   MCHC 24.8* 25.3*   RDW 27.4 27.0   NEPRELIM 1.53 1.24*   WBC 3.9* 3.8*   .0* 483.0*       Recent Labs   Lab 12/02/24  1100 12/03/24  0736   GLU 91 96   BUN 9 9   CREATSERUM 0.51* 0.55   EGFRCR 112 110   CA 9.5 9.1   ALB 4.4 4.3    142   K 3.4* 3.4*    107   CO2 26.0 28.0   ALKPHO 93 94   AST 18 17   ALT <7* <7*   BILT 0.8 0.8   TP 7.7 7.2       Lab Results   Component Value Date    INR 1.12 12/03/2024       No results for input(s): \"TROP\", \"TROPHS\", \"CK\" in the last 168 hours.    No results for input(s): \"TROP\", \"PBNP\" in the last 168 hours.    No results for input(s): \"PCT\" in the last 168 hours.    Imaging: Imaging data reviewed in Epic.    Assessment & Plan:      # Anemia  -Mild leukopenia and thrombocytosis  -History of thalassemia  -History of heavy vaginal bleeding  -Shortness of breath dizziness  -Transfuse 2 units packed RBCs  -Check pelvic ultrasound    #  Possible history of hypertension with normal blood pressure today and on no medicines at home        Plan of care discussed with patient and emergency room physician    Anabell Marcial MD    Supplementary Documentation:     The 21st Century Cures Act makes medical notes like these available to patients in the interest of transparency. Please be advised this is a medical document. Medical documents are intended to carry relevant information, facts as evident, and the clinical opinion of the practitioner. The medical note is intended as peer to peer communication and may appear blunt or direct. It is written in medical language and may contain abbreviations or verbiage that are unfamiliar.                                       [1] No Known Allergies  [2]   No current facility-administered medications on file prior to encounter.     Current Outpatient Medications on File Prior to Encounter   Medication Sig Dispense Refill    valACYclovir 500 MG Oral Tab 1/2 a tablet daily (Patient not taking: Reported on 12/3/2024) 90 tablet 1

## 2024-12-03 NOTE — ED PROVIDER NOTES
Patient Seen in: [facility] Emergency Department      History     Chief Complaint   Patient presents with    Abnormal Result     Stated Complaint: Primary care  sent pt in .    Subjective:   HPI      54-year-old female with a past medical history as below including hypertension, thalassemia anemia presents after labs done yesterday showed low hemoglobin 4.1.  Patient states she saw her PCP due to fatigue for the past week along with irregular menstrual periods.  She states her periods have been very heavy for the past 1 to 2 years and passes blood clots.  Patient states she finished her period earlier this month and then started having some spotting last week.  She does admit to having some dyspnea on exertion and palpitations.  Denies chest pain.  Denies feeling lightheaded.  Denies rectal bleeding or melena.    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   Tobacco Use    Smoking status: Never    Smokeless tobacco: Never    Tobacco comments:     nonsmoker   Vaping Use    Vaping status: Never Used   Substance and Sexual Activity    Alcohol use: Not Currently     Alcohol/week: 10.0 standard drinks of alcohol     Types: 10 Standard drinks or equivalent per week     Comment: twice a year    Drug use: No    Sexual activity: Never     Partners: Male   Other Topics Concern    Caffeine Concern No    Stress Concern No    Weight Concern No    Special Diet No    Exercise No    Seat Belt Yes   Social History Narrative    Balanced diet sometimes, no exercise                  Physical Exam     ED Triage Vitals [24 0716]   /73    Pulse 84   Resp 15   Temp 98.3 °F (36.8 °C)   Temp src Oral   SpO2 100 %   O2 Device None (Room air)       Current Vitals:   Vital Signs  BP: 138/69  Pulse: 66  Resp: 12  Temp: 98.3 °F (36.8 °C)  Temp src: Oral  MAP (mmHg): 90    Oxygen Therapy  SpO2: 100 %  O2 Device: None (Room air)        Physical Exam  Vitals and nursing note reviewed.   Constitutional:       Appearance: She is well-developed.   HENT:      Head: Normocephalic and atraumatic.      Mouth/Throat:      Mouth: Mucous membranes are moist.   Eyes:      General: No scleral icterus.  Cardiovascular:      Rate and Rhythm: Normal rate and regular rhythm.   Pulmonary:      Effort: Pulmonary effort is normal.      Breath sounds: Normal breath sounds.   Abdominal:      Palpations: Abdomen is soft.      Tenderness: There is no abdominal tenderness.   Skin:     General: Skin is warm and dry.   Neurological:      General: No focal deficit present.      Mental Status: She is alert and oriented to person, place, and time.      Cranial Nerves: No cranial nerve deficit.      Motor: No weakness.   Psychiatric:         Mood and Affect: Mood normal.         Behavior: Behavior normal.             ED Course     Labs Reviewed   COMP METABOLIC PANEL (14) - Abnormal; Notable for the following components:       Result Value    Potassium 3.4 (*)     ALT <7 (*)     All other components within normal limits   CBC WITH DIFFERENTIAL WITH PLATELET - Abnormal; Notable for the following components:    WBC 3.8 (*)     RBC 2.78 (*)     HGB 4.0 (*)     HCT 15.8 (*)     .0 (*)     MCV 56.8 (*)     MCH 14.4 (*)     MCHC 25.3 (*)     Neutrophil Absolute Prelim 1.24 (*)     Neutrophil Absolute 1.24 (*)     All other components within normal limits   PROTHROMBIN TIME (PT) - Normal   PTT, ACTIVATED - Normal   TYPE AND SCREEN    Narrative:     The following orders were created for panel order Type and screen.  Procedure                               Abnormality         Status                      ---------                               -----------         ------                     ABORH (Blood Type)[[id number]]                               Final result               Antibody Screen[[id number]]                                  Final result                 Please view results for these tests on the individual orders.   ABORH (BLOOD TYPE)   ANTIBODY SCREEN   PREPARE RBC   RAINBOW DRAW LAVENDER   RAINBOW DRAW LIGHT GREEN   RAINBOW DRAW GOLD   RAINBOW DRAW BLUE     EKG    Rate, intervals and axes as noted on EKG Report.  Rate: 77  Rhythm: Sinus Rhythm  Reading: Normal sinus rhythm, no ST/T wave changes                    A total of 38 minutes of critical care time (exclusive of billable procedures) was administered to manage the patient's critical lab values due to her severe anemia with hemoglobin of 4.  This involved direct patient intervention, complex decision making, and/or extensive discussions with the patient, family, and clinical staff.       [facility]      54-year-old female with a past medical history as below including hypertension, thalassemia anemia presents after labs done yesterday showed low hemoglobin 4.1.      Chart review shows patient was seen by OB yesterday for dysfunctional uterine bleeding and fatigue.  She had labs that showed  hemoglobin of 4.1 with microcytic indices.  Serum iron low at 6 with 1% saturation.    Differential includes but is not limited to severe iron deficiency anemia, thalassemia, menorrhagia, dysfunctional uterine bleeding    Labs confirm severe anemia with hemoglobin of 4.  Blood transfusion ordered.  Will admit for further management.  Discussed with hospitalist Dr. Marcial.      Admission disposition: 12/3/2024  8:17 AM         Medical Decision Making  Amount and/or Complexity of Data Reviewed  External Data Reviewed: labs and notes.     Details: See [facility]  Labs: ordered. Decision-making details documented in ED Course.  Discussion of management or test  interpretation with external provider(s): Hospitalist    Risk  Decision regarding hospitalization.        Disposition and Plan     Clinical Impression:  1. Severe anemia         Disposition:  Admit  12/3/2024  8:17 am    Follow-up:  No follow-up provider specified.        Medications Prescribed:  Current Discharge Medication List              Supplementary Documentation:         Hospital Problems       Present on Admission  Date Reviewed: 12/2/2024            ICD-10-CM Noted POA    * (Principal) Severe anemia D64.9 12/3/2024 Unknown

## 2024-12-03 NOTE — BH RN ADMISSION NOTE
NURSING ADMISSION NOTE      Patient admitted via Cart  Oriented to room.  Safety precautions initiated.  Bed in low position.  Call light in reach.    Patient admitted via cart from ED. 1 Unit of 2 PRBC's infusing from ED. VSS. A/O x 4. Admission Navigator completed by Kiya Gilmore RN.

## 2024-12-03 NOTE — PLAN OF CARE
Patient is A/O x4. VSS. Afebrile. Room air. No complaints of pain. Regular diet; tolerating well. Ambulatory. Voids. Hgb 4.0 in ED. 2 units of PRBC's transfused. Repeat Hgb pending. K+ 3.4, replaced per protocol. PIV saline locked. Safety precautions in place. Call light within reach.  1650: Repeat Hgb 6.0. MD notified. No new orders.  1730: US pelvis ordered.  Problem: METABOLIC/FLUID AND ELECTROLYTES - ADULT  Goal: Electrolytes maintained within normal limits  Description: INTERVENTIONS:  - Monitor labs and rhythm and assess patient for signs and symptoms of electrolyte imbalances  - Administer electrolyte replacement as ordered  - Monitor response to electrolyte replacements, including rhythm and repeat lab results as appropriate  - Fluid restriction as ordered  - Instruct patient on fluid and nutrition restrictions as appropriate  Outcome: Progressing  Goal: Hemodynamic stability and optimal renal function maintained  Description: INTERVENTIONS:  - Monitor labs and assess for signs and symptoms of volume excess or deficit  - Monitor intake, output and patient weight  - Monitor urine specific gravity, serum osmolarity and serum sodium as indicated or ordered  - Monitor response to interventions for patient's volume status, including labs, urine output, blood pressure (other measures as available)  - Encourage oral intake as appropriate  - Instruct patient on fluid and nutrition restrictions as appropriate  Outcome: Progressing     Problem: HEMATOLOGIC - ADULT  Goal: Maintains hematologic stability  Description: INTERVENTIONS  - Assess for signs and symptoms of bleeding or hemorrhage  - Monitor labs and vital signs for trends  - Administer supportive blood products/factors, fluids and medications as ordered and appropriate  - Administer supportive blood products/factors as ordered and appropriate  Outcome: Progressing

## 2024-12-04 ENCOUNTER — TELEPHONE (OUTPATIENT)
Dept: FAMILY MEDICINE CLINIC | Facility: CLINIC | Age: 54
End: 2024-12-04

## 2024-12-04 ENCOUNTER — APPOINTMENT (OUTPATIENT)
Dept: ULTRASOUND IMAGING | Facility: HOSPITAL | Age: 54
End: 2024-12-04
Attending: STUDENT IN AN ORGANIZED HEALTH CARE EDUCATION/TRAINING PROGRAM
Payer: COMMERCIAL

## 2024-12-04 VITALS
DIASTOLIC BLOOD PRESSURE: 79 MMHG | TEMPERATURE: 99 F | HEIGHT: 65 IN | RESPIRATION RATE: 14 BRPM | WEIGHT: 152 LBS | HEART RATE: 80 BPM | SYSTOLIC BLOOD PRESSURE: 165 MMHG | BODY MASS INDEX: 25.33 KG/M2 | OXYGEN SATURATION: 100 %

## 2024-12-04 LAB
BLOOD TYPE BARCODE: 5100
ERYTHROCYTE [DISTWIDTH] IN BLOOD BY AUTOMATED COUNT: 31.2 %
HCT VFR BLD AUTO: 20.7 %
HGB A2 MFR BLD: 3.2 % (ref 1.5–3.5)
HGB BLD-MCNC: 6 G/DL
HGB BLD-MCNC: 6.8 G/DL
HGB BLD-MCNC: 8.7 G/DL
HGB PNL BLD ELPH: 96.8 % (ref 95.5–100)
MCH RBC QN AUTO: 18.8 PG (ref 26–34)
MCHC RBC AUTO-ENTMCNC: 29 G/DL (ref 31–37)
MCV RBC AUTO: 64.9 FL
PLATELET # BLD AUTO: 338 10(3)UL (ref 150–450)
RBC # BLD AUTO: 3.19 X10(6)UL
UNIT VOLUME: 350 ML
WBC # BLD AUTO: 4.7 X10(3) UL (ref 4–11)

## 2024-12-04 PROCEDURE — 76830 TRANSVAGINAL US NON-OB: CPT | Performed by: STUDENT IN AN ORGANIZED HEALTH CARE EDUCATION/TRAINING PROGRAM

## 2024-12-04 PROCEDURE — 76856 US EXAM PELVIC COMPLETE: CPT | Performed by: STUDENT IN AN ORGANIZED HEALTH CARE EDUCATION/TRAINING PROGRAM

## 2024-12-04 RX ORDER — CEPHALEXIN 500 MG/1
500 CAPSULE ORAL 3 TIMES DAILY
Status: DISCONTINUED | OUTPATIENT
Start: 2024-12-04 | End: 2024-12-04

## 2024-12-04 RX ORDER — SODIUM CHLORIDE 9 MG/ML
INJECTION, SOLUTION INTRAVENOUS ONCE
Status: DISCONTINUED | OUTPATIENT
Start: 2024-12-04 | End: 2024-12-04

## 2024-12-04 RX ORDER — CEPHALEXIN 500 MG/1
500 CAPSULE ORAL 3 TIMES DAILY
Qty: 15 CAPSULE | Refills: 0 | Status: SHIPPED | OUTPATIENT
Start: 2024-12-04 | End: 2024-12-09

## 2024-12-04 NOTE — PLAN OF CARE
Patient is A/O x4. VSS. Afebrile. Room air. No complaints of pain. Regular diet; tolerating well. Ambulatory. Voids. Hgb 6.0 this morning. 1 unit PRBC's ordered and transfused. IVF infused per orders. US pelvis ordered and pending. PIV saline locked. Safety precautions in place. Call light within reach.  Repeat Hgb 6.8, MD notified. See new orders.  1715: 1 Unit PRBC's transfused. Awaiting repeat Hgb. Patient off unit for pelvic US, pt given 32oz water to drink per US request. Pt was able to drink prior to leaving unit.  1900: Repeat Hgb 8.7. MD notified. Patient cleared for DC.  Problem: METABOLIC/FLUID AND ELECTROLYTES - ADULT  Goal: Electrolytes maintained within normal limits  Description: INTERVENTIONS:  - Monitor labs and rhythm and assess patient for signs and symptoms of electrolyte imbalances  - Administer electrolyte replacement as ordered  - Monitor response to electrolyte replacements, including rhythm and repeat lab results as appropriate  - Fluid restriction as ordered  - Instruct patient on fluid and nutrition restrictions as appropriate  Outcome: Progressing  Goal: Hemodynamic stability and optimal renal function maintained  Description: INTERVENTIONS:  - Monitor labs and assess for signs and symptoms of volume excess or deficit  - Monitor intake, output and patient weight  - Monitor urine specific gravity, serum osmolarity and serum sodium as indicated or ordered  - Monitor response to interventions for patient's volume status, including labs, urine output, blood pressure (other measures as available)  - Encourage oral intake as appropriate  - Instruct patient on fluid and nutrition restrictions as appropriate  Outcome: Progressing     Problem: HEMATOLOGIC - ADULT  Goal: Maintains hematologic stability  Description: INTERVENTIONS  - Assess for signs and symptoms of bleeding or hemorrhage  - Monitor labs and vital signs for trends  - Administer supportive blood products/factors, fluids and  medications as ordered and appropriate  - Administer supportive blood products/factors as ordered and appropriate  Outcome: Progressing

## 2024-12-04 NOTE — PROGRESS NOTES
[facility]   part of [facility]     Hospitalist Progress Note     Yvonne Washburn Patient Status:  Inpatient    1970 MRN [id number]   Location [facility] 4NW-A Attending Sruthi Marcano MD   Hosp Day # 1 PCP Neelam Dean MD     Chief Complaint: Anemia    Subjective:     Patient feeling well, wants to go home    Objective:    Review of Systems:   A comprehensive review of systems was completed; pertinent positive and negatives stated in subjective.    Vital signs:  Temp:  [97.7 °F (36.5 °C)-98.4 °F (36.9 °C)] 98 °F (36.7 °C)  Pulse:  [64-82] 66  Resp:  [12-18] 14  BP: (123-167)/(60-78) 138/78  SpO2:  [100 %] 100 %    Physical Exam:    General: No acute distress  Respiratory: No wheezes, no rhonchi  Cardiovascular: S1, S2, regular rate and rhythm  Abdomen: Soft, Non-tender, non-distended, positive bowel sounds  Neuro: No new focal deficits.   Extremities: No edema      Diagnostic Data:    Labs:  Recent Labs   Lab 24  1100 24  0736 24  1623 24  0546 24  1312   WBC 3.9* 3.8*  --  4.7  --    HGB 4.1* 4.0* 6.0* 6.0* 6.8*   MCV 58.5* 56.8*  --  64.9*  --    .0* 483.0*  --  338.0  --    INR  --  1.12  --   --   --        Recent Labs   Lab 24  1100 24  0736 24  2319   GLU 91 96  --    BUN 9 9  --    CREATSERUM 0.51* 0.55  --    CA 9.5 9.1  --    ALB 4.4 4.3  --     142  --    K 3.4* 3.4* 4.2    107  --    CO2 26.0 28.0  --    ALKPHO 93 94  --    AST 18 17  --    ALT <7* <7*  --    BILT 0.8 0.8  --    TP 7.7 7.2  --        Estimated Creatinine Clearance: 107.7 mL/min (based on SCr of 0.55 mg/dL).    No results for input(s): \"TROP\", \"TROPHS\", \"CK\" in the last 168 hours.    Recent Labs   Lab 24  0736   PTP 14.6   INR 1.12                  Microbiology    No results found for this visit on 24.      Imaging: Reviewed in Epic.    Medications:    sodium chloride   Intravenous Once    sodium chloride   Intravenous Once        Assessment & Plan:      #Profound anemia due to menorrhagia and h/o thalassemia trait  S/p 3 u PRBC, Hb <7  Obgyn on Cs  TVUS    Possible DC today after TV US and PRBC      Sruthi Marcano MD    Supplementary Documentation:     Quality:  DVT Mechanical Prophylaxis:   SCDs, Early ambuation  DVT Pharmacologic Prophylaxis   Medication   None                Code Status: Not on file  Putnam: No urinary catheter in place  Putnam Duration (in days):   Central line:    TAB: 12/4/2024    Discharge is dependent on: repeat Hb  At this point Ms. Washburn is expected to be discharge to: home    The 21st Century Cures Act makes medical notes like these available to patients in the interest of transparency. Please be advised this is a medical document. Medical documents are intended to carry relevant information, facts as evident, and the clinical opinion of the practitioner. The medical note is intended as peer to peer communication and may appear blunt or direct. It is written in medical language and may contain abbreviations or verbiage that are unfamiliar.

## 2024-12-04 NOTE — PLAN OF CARE
Pt received A&O x 4. Pt questions about her POC addressed. No c/o of pain. VSS. No SOB on RA. Safety precautions in place. Hgb 6.0, MD notified, 1 unit PRBCs ordered.    Problem: METABOLIC/FLUID AND ELECTROLYTES - ADULT  Goal: Electrolytes maintained within normal limits  Description: INTERVENTIONS:  - Monitor labs and rhythm and assess patient for signs and symptoms of electrolyte imbalances  - Administer electrolyte replacement as ordered  - Monitor response to electrolyte replacements, including rhythm and repeat lab results as appropriate  - Fluid restriction as ordered  - Instruct patient on fluid and nutrition restrictions as appropriate  Outcome: Progressing  Goal: Hemodynamic stability and optimal renal function maintained  Description: INTERVENTIONS:  - Monitor labs and assess for signs and symptoms of volume excess or deficit  - Monitor intake, output and patient weight  - Monitor urine specific gravity, serum osmolarity and serum sodium as indicated or ordered  - Monitor response to interventions for patient's volume status, including labs, urine output, blood pressure (other measures as available)  - Encourage oral intake as appropriate  - Instruct patient on fluid and nutrition restrictions as appropriate  Outcome: Progressing     Problem: HEMATOLOGIC - ADULT  Goal: Maintains hematologic stability  Description: INTERVENTIONS  - Assess for signs and symptoms of bleeding or hemorrhage  - Monitor labs and vital signs for trends  - Administer supportive blood products/factors, fluids and medications as ordered and appropriate  - Administer supportive blood products/factors as ordered and appropriate  Outcome: Progressing

## 2024-12-04 NOTE — TELEPHONE ENCOUNTER
Patient called to state she is currently in the hospital, will not be at todays appointment.  Advised to call office to schedule a post hospital follow up appointment.    Dr. Jermaine VIZCARRA - patient currently in hospital

## 2024-12-05 ENCOUNTER — PATIENT OUTREACH (OUTPATIENT)
Dept: CASE MANAGEMENT | Age: 54
End: 2024-12-05

## 2024-12-05 ENCOUNTER — LAB REQUISITION (OUTPATIENT)
Dept: LAB | Facility: HOSPITAL | Age: 54
End: 2024-12-05
Payer: COMMERCIAL

## 2024-12-05 ENCOUNTER — TELEPHONE (OUTPATIENT)
Dept: FAMILY MEDICINE CLINIC | Facility: CLINIC | Age: 54
End: 2024-12-05

## 2024-12-05 DIAGNOSIS — N92.0 EXCESSIVE AND FREQUENT MENSTRUATION WITH REGULAR CYCLE: ICD-10-CM

## 2024-12-05 DIAGNOSIS — D64.9 SEVERE ANEMIA: Primary | ICD-10-CM

## 2024-12-05 DIAGNOSIS — Z02.9 ENCOUNTERS FOR ADMINISTRATIVE PURPOSES: ICD-10-CM

## 2024-12-05 DIAGNOSIS — D64.9 ANEMIA, UNSPECIFIED: ICD-10-CM

## 2024-12-05 DIAGNOSIS — N93.9 ABNORMAL UTERINE AND VAGINAL BLEEDING, UNSPECIFIED: ICD-10-CM

## 2024-12-05 LAB
BASOPHILS # BLD AUTO: 0.05 X10(3) UL (ref 0–0.2)
BASOPHILS NFR BLD AUTO: 0.9 %
BLOOD TYPE BARCODE: 5100
BLOOD TYPE BARCODE: 5100
EOSINOPHIL # BLD AUTO: 0.12 X10(3) UL (ref 0–0.7)
EOSINOPHIL NFR BLD AUTO: 2.3 %
ESTRADIOL SERPL-MCNC: 216.6 PG/ML
FSH SERPL-ACNC: 5.5 MIU/ML
HCT VFR BLD AUTO: 30.2 %
HGB BLD-MCNC: 8.9 G/DL
IMM GRANULOCYTES # BLD AUTO: 0.01 X10(3) UL (ref 0–1)
IMM GRANULOCYTES NFR BLD: 0.2 %
LYMPHOCYTES # BLD AUTO: 1.91 X10(3) UL (ref 1–4)
LYMPHOCYTES NFR BLD AUTO: 36.1 %
MCH RBC QN AUTO: 20.5 PG (ref 26–34)
MCHC RBC AUTO-ENTMCNC: 29.5 G/DL (ref 31–37)
MCV RBC AUTO: 69.6 FL
MONOCYTES # BLD AUTO: 0.44 X10(3) UL (ref 0.1–1)
MONOCYTES NFR BLD AUTO: 8.3 %
NEUTROPHILS # BLD AUTO: 2.76 X10 (3) UL (ref 1.5–7.7)
NEUTROPHILS # BLD AUTO: 2.76 X10(3) UL (ref 1.5–7.7)
NEUTROPHILS NFR BLD AUTO: 52.2 %
PLATELET # BLD AUTO: 322 10(3)UL (ref 150–450)
PLATELET MORPHOLOGY: NORMAL
PLATELETS.RETICULATED NFR BLD AUTO: 2.9 % (ref 0–7)
RBC # BLD AUTO: 4.34 X10(6)UL
UNIT VOLUME: 350 ML
UNIT VOLUME: 350 ML
WBC # BLD AUTO: 5.3 X10(3) UL (ref 4–11)

## 2024-12-05 PROCEDURE — 85025 COMPLETE CBC W/AUTO DIFF WBC: CPT | Performed by: OBSTETRICS & GYNECOLOGY

## 2024-12-05 PROCEDURE — 83001 ASSAY OF GONADOTROPIN (FSH): CPT | Performed by: OBSTETRICS & GYNECOLOGY

## 2024-12-05 PROCEDURE — 82670 ASSAY OF TOTAL ESTRADIOL: CPT | Performed by: OBSTETRICS & GYNECOLOGY

## 2024-12-05 PROCEDURE — 88305 TISSUE EXAM BY PATHOLOGIST: CPT | Performed by: OBSTETRICS & GYNECOLOGY

## 2024-12-05 NOTE — PROGRESS NOTES
Transitional Care Management   Discharge Date: 24  Contact Date: 2024    Assessment:  TCM Initial Assessment    General:  Assessment completed with: Patient  Patient Subjective: I feel good.  Chief Complaint: Severe Anemia  Verify patient name and  with patient/ caregiver: Yes    Hospital Stay/Discharge:  Tell me what you understand of why you were in the hospital or emergency department: heavy bleeding.  Prior to leaving the hospital were your Discharge Instructions reviewed with you?: Yes  Did you receive a copy of your written Discharge Instructions?: Yes  What questions do you have about your Discharge Instructions?: none  Do you feel better or worse since you left the hospital or emergency department?: Better    Follow - Up Appointment:  Do you have a follow-up appointment?: No  Are there any barriers to getting to your follow-up appointment?: No    Home Health/DME:  Prior to leaving the hospital was Home Health (HH) arranged for you?: No     Prior to leaving the hospital or emergency department was Durable Medical Equipment (DME), medical supplies, or infusions arranged for you?: No  Are DME/medical supply/infusions needs identified by staff during this assessment?: No     Medications/Diet:  Did any of your medications change, during or after your hospital stay or ED visit?: Yes  Do you have your new or updated medications?: No (Pt states that she will  the antibiotic today)  Do you understand what your medications are for and possible side effects?: Yes  Are there any reasons that keep you from taking your medication as prescribed?: No  Any concerns about medication refills?: No    Were you given a different diet per your Discharge Instructions?: No     Questions/Concerns:  Do you have any questions or concerns that have not been discussed?: No         Nursing Interventions: NARGIS reviewed discharge instructions and when to seek medical attention with the patient. She states that she is  feeling good. She has not had any further bleeding. She denied having any fever, n/v/c/d, sob, pain, lightheadedness, HA or any new or worsening symptoms. Med review completed. She denied having any questions or concerns at this time.     Medication Review:   Current Outpatient Medications   Medication Sig Dispense Refill    cephALEXin 500 MG Oral Cap Take 1 capsule (500 mg total) by mouth 3 (three) times daily for 5 days. 15 capsule 0    valACYclovir 500 MG Oral Tab 1/2 a tablet daily (Patient not taking: Reported on 12/3/2024) 90 tablet 1     Did patient review medications using current pill bottles and not just a medication list?  No  Discharge medications reviewed/discussed/and reconciled against outpatient medications with patient.  Any changes or updates to medications sent to primary care provider.  Patient Acknowledged    SDOH:   Transportation Needs: No Transportation Needs (12/3/2024)    Transportation Needs     Lack of Transportation: No     Car Seat: Not on file     Financial Resource Strain: Low Risk  (12/5/2024)    Financial Resource Strain     Difficulty of Paying Living Expenses: Not very hard     Med Affordability: Not on file           Follow-up Appointments:      Transitional Care Clinic  Was TCC Ordered: No      Primary Care Provider (If no TCC appointment)  Does patient already have a PCP appointment scheduled? No  Nurse Care Manager Attempted to schedule PCP office TCM appointment with patient   -If no appointment scheduled: Explain -pt states that she will call back to schedule after checking her work schedule. NCM sent TE to PCP office.     Specialist  Does the patient have any other follow-up appointment(s) that need to be scheduled? Yes   -If yes: Nurse Care Manager reviewed upcoming specialist appointments with patient: Yes   -Does the patient need assistance scheduling appointment(s): No, pt states that she will call today to schedule with Gyne.     CCM referral placed:  No    Book By  Date: 12/18/24

## 2024-12-05 NOTE — BH RN DISCHARGE NOTE
NURSING DISCHARGE NOTE    Discharged Home via Ambulatory.  Accompanied by Family member  Belongings Taken by patient/family.

## 2024-12-05 NOTE — TELEPHONE ENCOUNTER
CARMITA, Spoke to patient for TCM today.  Patient states that she will call back to schedule after checking her work schedule.  Per guidelines patient should  be seen within seven days by 12/11/24.     Otherwise, last date for TCM: 12/18/24

## 2024-12-08 NOTE — DISCHARGE SUMMARY
[facility]  DISCHARGE SUMMARY     Yvonne Washburn Patient Status:  Inpatient    1970 MRN [id number]   Location [facility] 4NW-A Attending No att. providers found   Hosp Day # 1 PCP Neelam Dean MD     Date of Admission: 12/3/2024  Date of Discharge:  2024     Discharge Disposition: Home or Self Care    Discharge Diagnosis:  #Profound anemia due to menorrhagia and h/o thalassemia trait  S/p 3 u PRBC, Hb <7  Obgyn on Cs  TVUS  #h/o thalassemia     History of Present Illness:   Yvonne Washburn is a 54 year old female with history of lifelong anemia who was told about 20 years ago that she needs a hysterectomy because she had heavy vaginal bleeding episodes.  Patient was seen by her primary care physician yesterday for the above symptoms and labs were checked.  Her hemoglobin is 4.1.  Patient states that she has been having worse bleeding during the past 6 months with 2.  This month.  She has not seen her GYN in over a year.  Her hemoglobin was 6 about 8 years ago.  Pelvic ultrasound is pending.  Patient denies any hematemesis melena seeing any blood in the stool.  Her last menstrual period was .  Lasted 5 days and then started again on the  for the 3 days with heavy flow and passing clots.  She denies any abdominal pain.  Patient has 5 children the youngest is 15 years old.  She also has history of thalassemia and iron deficiency.     Brief Synopsis:   Pt was admitted- transfused PRBC and OBGYN cs. TV US compeleted see below- findings discussed with pt. She will followed up with OB as OP a    Lace+ Score: 29  59-90 High Risk  29-58 Medium Risk  0-28   Low Risk       TCM Follow-Up Recommendation:  LACE > 58: High Risk of readmission after discharge from the hospital.      Procedures during hospitalization:   no    Incidental or significant findings and recommendations (brief descriptions):  UTERUS:  10.86 cm x 6.22 cm x 6.85 cm    Endometrium Thickness: 1.98 cm    The uterus  appears normal in size, shape, and echogenicity.  RIGHT OVARY:  3.30 cm x 1.42 cm x 2.76 cm    The right ovary appears normal in size, shape, and echogenicity. No significant masses are identified.  LEFT OVARY:  3.56 cm x 2.28 cm x 3.32 cm    The left ovary appears normal in size, shape, and echogenicity. No significant masses are identified.  CUL-DE-SAC:  Normal.  No fluid or mass.    OTHER:  Negative.                        Impression   CONCLUSION:  There is thickening of the endometrium to approximately 20 mm.  This should be correlated to the patient's menstrual cycle.  In a postmenopausal female this is highly concerning for malignancy and further evaluation with pelvic MRI is  recommended.  If the patient is premenopausal consider follow-up ultrasound after menses to assess for resolution.             Lab/Test results pending at Discharge:   no    Consultants:  OBGYN    Discharge Medication List:     Discharge Medications        START taking these medications        Instructions Prescription details   cephALEXin 500 MG Caps  Commonly known as: Keflex      Take 1 capsule (500 mg total) by mouth 3 (three) times daily for 5 days.   Stop taking on: December 9, 2024  Quantity: 15 capsule  Refills: 0            ASK your doctor about these medications        Instructions Prescription details   valACYclovir 500 MG Tabs  Commonly known as: Valtrex      1/2 a tablet daily   Quantity: 90 tablet  Refills: 1               Where to Get Your Medications        These medications were sent to [id number] - [place], [place] - [address] [place] [address] [phone], [phone]  [address] [facility] [address]      Hours: 24-hours Phone: [phone]   cephALEXin 500 MG Caps         [facility]    Follow-up appointment:   Neelam Dean MD  [address]  [facility] [address] [phone]    Schedule an appointment as soon as possible for a visit      Mike Jose MD  [address] [facility]  [address]  [facility] [address]  [phone]    Go to      Appointments for Next 30 Days 2024 - 2025      None            Vital signs:       Physical Exam:    General: No acute distress   Lungs: clear to auscultation  Cardiovascular: S1, S2  Abdomen: Soft      -----------------------------------------------------------------------------------------------  PATIENT DISCHARGE INSTRUCTIONS: See electronic chart    Sruthi Marcano MD    Total time spent on discharge plannin minutes     The  Cent[employer] Cures Act makes medical notes like these available to patients in the interest of transparency. Please be advised this is a medical document. Medical documents are intended to carry relevant information, facts as evident, and the clinical opinion of the practitioner. The medical note is intended as peer to peer communication and may appear blunt or direct. It is written in medical language and may contain abbreviations or verbiage that are unfamiliar.

## 2025-02-01 ENCOUNTER — OFFICE VISIT (OUTPATIENT)
Dept: FAMILY MEDICINE CLINIC | Facility: CLINIC | Age: 55
End: 2025-02-01
Payer: COMMERCIAL

## 2025-02-01 VITALS
RESPIRATION RATE: 18 BRPM | SYSTOLIC BLOOD PRESSURE: 156 MMHG | HEART RATE: 68 BPM | WEIGHT: 161.81 LBS | TEMPERATURE: 99 F | OXYGEN SATURATION: 99 % | BODY MASS INDEX: 26.96 KG/M2 | HEIGHT: 65 IN | DIASTOLIC BLOOD PRESSURE: 73 MMHG

## 2025-02-01 DIAGNOSIS — R39.9 UTI SYMPTOMS: Primary | ICD-10-CM

## 2025-02-01 LAB
APPEARANCE: CLEAR
BILIRUBIN: NEGATIVE
GLUCOSE (URINE DIPSTICK): NEGATIVE MG/DL
KETONES (URINE DIPSTICK): NEGATIVE MG/DL
MULTISTIX LOT#: ABNORMAL NUMERIC
NITRITE, URINE: POSITIVE
PH, URINE: 7 (ref 4.5–8)
PROTEIN (URINE DIPSTICK): 100 MG/DL
SPECIFIC GRAVITY: 1.02 (ref 1–1.03)
URINE-COLOR: YELLOW
UROBILINOGEN,SEMI-QN: 1 MG/DL (ref 0–1.9)

## 2025-02-01 PROCEDURE — 3008F BODY MASS INDEX DOCD: CPT | Performed by: NURSE PRACTITIONER

## 2025-02-01 PROCEDURE — 3077F SYST BP >= 140 MM HG: CPT | Performed by: NURSE PRACTITIONER

## 2025-02-01 PROCEDURE — 87086 URINE CULTURE/COLONY COUNT: CPT | Performed by: NURSE PRACTITIONER

## 2025-02-01 PROCEDURE — 87186 SC STD MICRODIL/AGAR DIL: CPT | Performed by: NURSE PRACTITIONER

## 2025-02-01 PROCEDURE — 81003 URINALYSIS AUTO W/O SCOPE: CPT | Performed by: NURSE PRACTITIONER

## 2025-02-01 PROCEDURE — 87077 CULTURE AEROBIC IDENTIFY: CPT | Performed by: NURSE PRACTITIONER

## 2025-02-01 PROCEDURE — 99213 OFFICE O/P EST LOW 20 MIN: CPT | Performed by: NURSE PRACTITIONER

## 2025-02-01 PROCEDURE — 3078F DIAST BP <80 MM HG: CPT | Performed by: NURSE PRACTITIONER

## 2025-02-01 RX ORDER — SULFAMETHOXAZOLE AND TRIMETHOPRIM 800; 160 MG/1; MG/1
1 TABLET ORAL 2 TIMES DAILY
Qty: 6 TABLET | Refills: 0 | Status: SHIPPED | OUTPATIENT
Start: 2025-02-01 | End: 2025-02-04

## 2025-02-01 RX ORDER — PHENAZOPYRIDINE HYDROCHLORIDE 200 MG/1
200 TABLET, FILM COATED ORAL 3 TIMES DAILY PRN
Qty: 15 TABLET | Refills: 0 | Status: SHIPPED | OUTPATIENT
Start: 2025-02-01 | End: 2025-02-06

## 2025-02-01 NOTE — PATIENT INSTRUCTIONS
Please start the antibiotic as discussed. We will send a urine culture and advise you if a change is needed in your antibiotic.  May use Pyridium for pain every 8 hours as needed, will stain clothing/orange urine. May only need 1 or 2 doses until antibiotic begins to work.  Wipe from front to back after using the bathroom every time. Consider wet wipes for cleaning.  Change your pad frequently during spotting.  If sexually active urinate before and after sexual intercourse and wipe front to back.  Stay well hydrated, wear cotton underwear to decrease the spread of E. Coli to the urethra.  Cranberry extract may help make bacteria less likely to live in the bladder. Consider supplements during illness or regularly if frequent infections occur.  Please follow up with your primary care provider if not improved despite treatment. Seek immediate care for worsening symptoms.

## 2025-02-01 NOTE — PROGRESS NOTES
Yvonne Washburn is a 54 year old female.  HPI:   Patient presents with urinary symptoms. Complaining of urinary frequency, urgency, dysuria, cloudy urine. Denies flank pain, fever, hematuria.  Duration: 2 days  Sexual activity: not currently  Vaginal discharge: spotting, new IUD placed last month  Treatments tried: water      Current Outpatient Medications   Medication Sig Dispense Refill    valACYclovir 500 MG Oral Tab 1/2 a tablet daily (Patient not taking: Reported on 12/5/2024) 90 tablet 1      Past Medical History:    Anemia, unspecified    Essential hypertension, benign    Genital herpes    High blood pressure    History of pregnancy    childbirth x 5      Social History:  Social History     Socioeconomic History    Marital status:     Number of children: 5   Occupational History    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   Tobacco Use    Smoking status: Never    Smokeless tobacco: Never    Tobacco comments:     nonsmoker   Vaping Use    Vaping status: Never Used   Substance and Sexual Activity    Alcohol use: Not Currently     Alcohol/week: 10.0 standard drinks of alcohol     Types: 10 Standard drinks or equivalent per week     Comment: twice a year    Drug use: No    Sexual activity: Never     Partners: Male   Other Topics Concern    Caffeine Concern No    Stress Concern No    Weight Concern No    Special Diet No    Exercise No    Seat Belt Yes   Social History Narrative    Balanced diet sometimes, no exercise     Social Drivers of Health     Financial Resource Strain: Low Risk  (12/5/2024)    Financial Resource Strain     Difficulty of Paying Living Expenses: Not very hard   Food Insecurity: No Food Insecurity (12/3/2024)    Food Insecurity     Food Insecurity: Never true   Transportation Needs: No Transportation Needs (12/3/2024)    Transportation Needs     Lack of Transportation: No   Housing Stability: Low Risk  (12/3/2024)    Housing Stability     Housing Instability: No         REVIEW OF SYSTEMS:    GENERAL HEALTH: feels well otherwise  SKIN: denies any unusual skin lesions or rashes  RESPIRATORY: denies shortness of breath with exertion  CARDIOVASCULAR: denies chest pain on exertion  GI: denies nausea or vomiting  : denies hematuria  NEURO: denies headaches  MUSCULOSKELETAL:  denies CVA tenderness, denies new back pain    EXAM:   /73 (BP Location: Right arm)   Pulse 68   Temp 98.5 °F (36.9 °C) (Oral)   Resp 18   Ht 5' 5\" (1.651 m)   Wt 161 lb 12.8 oz (73.4 kg)   LMP 01/30/2025 (Exact Date)   SpO2 99%   BMI 26.92 kg/m²   GENERAL: well developed, well nourished,in no apparent distress  CARDIOVASCULAR:  Normal rate, normal S1, S2  SKIN: no rashes,no suspicious lesions  MUSCULOSKELETAL:  No CVA tenderness  GI/:  abd soft, bladder soft and nondistended. mild suprapubic tenderness  PSYCHIATRIC:  Alert and oriented x 3.  Results for orders placed or performed in visit on 02/01/25   URINALYSIS, AUTO, W/O SCOPE    Collection Time: 02/01/25  9:54 AM   Result Value Ref Range    Glucose Urine Negative Negative mg/dL    Bilirubin Urine Negative Negative    Ketones, UA Negative Negative - Trace mg/dL    Spec Gravity 1.020 1.005 - 1.030    Blood Urine Large (A) Negative    PH Urine 7.0 5.0 - 8.0    Protein Urine 100 (A) Negative - Trace mg/dL    Urobilinogen Urine 1.0 0.2 - 1.0 mg/dL    Nitrite Urine Positive (A) Negative    Leukocyte Esterase Urine Large (A) Negative    APPEARANCE clear Clear    Color Urine yellow Yellow    Multistix Lot# [id number] Numeric    Multistix Expiration Date 9/30/25 Date         ASSESSMENT AND PLAN:   UTI - Urine dip reviewed, antibiotics (see orders for specific medications).  Urine sent for culture and sensitivity will adjust antibiotic if needed. Verbal and written instructions given for patient care.  Patient will follow up with primary care if not improving and seek immediate care for worsening symptoms.  Patient verbalized understanding and agrees to plan.   Patient  Instructions   Please start the antibiotic as discussed. We will send a urine culture and advise you if a change is needed in your antibiotic.  May use Pyridium for pain every 8 hours as needed, will stain clothing/orange urine. May only need 1 or 2 doses until antibiotic begins to work.  Wipe from front to back after using the bathroom every time. Consider wet wipes for cleaning.  Change your pad frequently during spotting.  If sexually active urinate before and after sexual intercourse and wipe front to back.  Stay well hydrated, wear cotton underwear to decrease the spread of E. Coli to the urethra.  Cranberry extract may help make bacteria less likely to live in the bladder. Consider supplements during illness or regularly if frequent infections occur.  Please follow up with your primary care provider if not improved despite treatment. Seek immediate care for worsening symptoms.

## 2025-04-11 ENCOUNTER — OFFICE VISIT (OUTPATIENT)
Dept: FAMILY MEDICINE CLINIC | Facility: CLINIC | Age: 55
End: 2025-04-11
Payer: COMMERCIAL

## 2025-04-11 VITALS
BODY MASS INDEX: 29.49 KG/M2 | HEART RATE: 76 BPM | TEMPERATURE: 98 F | WEIGHT: 177 LBS | DIASTOLIC BLOOD PRESSURE: 84 MMHG | SYSTOLIC BLOOD PRESSURE: 130 MMHG | HEIGHT: 65 IN | RESPIRATION RATE: 18 BRPM | OXYGEN SATURATION: 98 %

## 2025-04-11 DIAGNOSIS — N92.0 MENORRHAGIA WITH REGULAR CYCLE: ICD-10-CM

## 2025-04-11 DIAGNOSIS — D64.9 SEVERE ANEMIA: Primary | ICD-10-CM

## 2025-04-11 DIAGNOSIS — E55.9 VITAMIN D DEFICIENCY: ICD-10-CM

## 2025-04-11 DIAGNOSIS — D56.3 THALASSEMIA MINOR: ICD-10-CM

## 2025-04-11 DIAGNOSIS — Z12.31 ENCOUNTER FOR SCREENING MAMMOGRAM FOR MALIGNANT NEOPLASM OF BREAST: ICD-10-CM

## 2025-04-11 DIAGNOSIS — D50.0 IRON DEFICIENCY ANEMIA DUE TO CHRONIC BLOOD LOSS: ICD-10-CM

## 2025-04-11 PROCEDURE — 3075F SYST BP GE 130 - 139MM HG: CPT | Performed by: FAMILY MEDICINE

## 2025-04-11 PROCEDURE — 3079F DIAST BP 80-89 MM HG: CPT | Performed by: FAMILY MEDICINE

## 2025-04-11 PROCEDURE — 3008F BODY MASS INDEX DOCD: CPT | Performed by: FAMILY MEDICINE

## 2025-04-11 PROCEDURE — 99214 OFFICE O/P EST MOD 30 MIN: CPT | Performed by: FAMILY MEDICINE

## 2025-04-11 RX ORDER — ERGOCALCIFEROL 1.25 MG/1
50000 CAPSULE, LIQUID FILLED ORAL WEEKLY
Qty: 12 CAPSULE | Refills: 0 | Status: SHIPPED | OUTPATIENT
Start: 2025-04-11

## 2025-04-11 RX ORDER — LEVONORGESTREL 52 MG/1
1 INTRAUTERINE DEVICE INTRAUTERINE ONCE
COMMUNITY
Start: 2024-12-23

## 2025-04-11 NOTE — PROGRESS NOTES
Family Medicine Progress Note  ASSESSMENT AND PLAN:  Yvonne Washburn is a 54 year old female who is here for:   Yvonne was seen today for other and medication follow-up.    Diagnoses and all orders for this visit:    Severe anemia  -     Iron And Tibc [E]; Future  -     Ferritin [E]; Future  -     CBC W Differential W Platelet [E]; Future    Encounter for screening mammogram for malignant neoplasm of breast  -     [facility] ABDIAZIZ 2D+3D SCREENING BILAT (CPT=77067/05038); Future    Vitamin D deficiency  -     ergocalciferol 1.25 MG (76899 UT) Oral Cap; Take 1 capsule (50,000 Units total) by mouth once a week.    Iron deficiency anemia due to chronic blood loss  -     Iron And Tibc [E]; Future  -     Ferritin [E]; Future  -     iron supplement if iron levels are low.    Thalassemia minor       - Hgb currently low but stable.    Menorrhagia with regular cycle  IUd placed by gyn in Dec, has spotting but her period is not heavy, has follow up scheduled with gyn.         The patient indicates understanding of these issues and agrees to the plan.  Follow-Up: Annual    Neelam Dean MD        Chief Complaint   Patient presents with    Medication Follow-Up       HPI:   Yvonne Washburn is a 54 year old female with severe iron deficiency anemia due to chronic blood loss from heavy menstrual cycle seen for follow up.    Date of Admission: 12/3/2024  Date of Discharge:  12/4/2024     Patient was admitted with a Hgb of 4.1, transfused PRBC's x2, discharged and followed up with gyn, had Mirena IUD placed in dec. Spotting on and off but period is lighter.  Stats her fatigue and shortness of breath are better, does not feel the need to take naps during the day and feels much better.  Has not been taking a iron supplement.  Last Hgb done in dec was 8.9.    ALLERGY:     MEDICATIONS:     Current Medications[1]   Past Medical History[2]   Social History:  Short Social Hx on File[3]     REVIEW OF SYSTEMS:   GENERAL HEALTH: feels well  otherwise  SKIN: denies any unusual skin lesions or rashes  RESPIRATORY: denies shortness of breath with exertion  CARDIOVASCULAR: denies chest pain on exertion  GI: denies abdominal pain and denies heartburn  NEURO: denies headaches    EXAM:   /84   Pulse 76   Temp 97.9 °F (36.6 °C) (Temporal)   Resp 18   Ht 5' 5\" (1.651 m)   Wt 177 lb (80.3 kg)   LMP 04/11/2025 (Exact Date)   SpO2 98%   BMI 29.45 kg/m²   GENERAL: well developed, well nourished,in no apparent distress  NECK: supple,no adenopathy,no bruits  LUNGS: clear to auscultation  CARDIO: RRR without murmur  EXTREMITIES: no  edema      NOTE TO PATIENT: The 21st Century Cures Act makes clinical notes like these available to patients in the interest of transparency. Clinical notes are medical documents used by physicians and care providers to communicate with each other. These documents include medical language and terminology, abbreviations, and treatment information that may sound technical and at times possibly unfamiliar. In addition, at times, the verbiage may appear blunt or direct. These documents are one tool providers use to communicate relevant information and clinical opinions of the care providers in a way that allows common understanding of the clinical context.      Neelam Dean MD             [1]   Current Outpatient Medications   Medication Sig Dispense Refill    Levonorgestrel (MIRENA, 52 MG,) 20 MCG/DAY Intrauterine IUD 20 mcg (1 each total) by Intrauterine route once.      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   Tobacco Use    Smoking  status: Never    Smokeless tobacco: Never    Tobacco comments:     nonsmoker   Vaping Use    Vaping status: Never Used   Substance and Sexual Activity    Alcohol use: Not Currently     Alcohol/week: 10.0 standard drinks of alcohol     Types: 10 Standard drinks or equivalent per week     Comment: twice a year    Drug use: No    Sexual activity: Never     Partners: Male   Other Topics Concern    Caffeine Concern No    Stress Concern No    Weight Concern No    Special Diet No    Exercise No    Seat Belt Yes   Social History Narrative    Balanced diet sometimes, no exercise     Social Drivers of Health     Food Insecurity: No Food Insecurity (4/11/2025)    NCSS - Food Insecurity     Worried About Running Out of Food in the Last Year: No     Ran Out of Food in the Last Year: No   Transportation Needs: No Transportation Needs (4/11/2025)    NCSS - Transportation     Lack of Transportation: No   Housing Stability: Not At Risk (4/11/2025)    NCSS - Housing/Utilities     Has Housing: Yes     Worried About Losing Housing: No     Unable to Get Utilities: No

## 2025-05-16 ENCOUNTER — TELEPHONE (OUTPATIENT)
Dept: FAMILY MEDICINE CLINIC | Facility: CLINIC | Age: 55
End: 2025-05-16

## 2025-05-16 DIAGNOSIS — Z12.11 SCREENING FOR COLON CANCER: Primary | ICD-10-CM

## 2025-05-16 NOTE — TELEPHONE ENCOUNTER
Patient called stating she last saw Dr. Dean 4-11-25.  She was under the assumption doctor was going to have the Gastro referral updated from last year.     See Referral dated 4-3-24 to a [facility].  Patient never went in the past.

## 2025-05-30 ENCOUNTER — HOSPITAL ENCOUNTER (OUTPATIENT)
Dept: MAMMOGRAPHY | Age: 55
Discharge: HOME | End: 2025-05-30
Attending: FAMILY MEDICINE
Payer: COMMERCIAL

## 2025-05-30 DIAGNOSIS — Z12.31 ENCOUNTER FOR SCREENING MAMMOGRAM FOR MALIGNANT NEOPLASM OF BREAST: ICD-10-CM

## 2025-05-30 PROCEDURE — 77067 SCR MAMMO BI INCL CAD: CPT | Performed by: FAMILY MEDICINE

## 2025-05-30 PROCEDURE — 77063 BREAST TOMOSYNTHESIS BI: CPT | Performed by: FAMILY MEDICINE

## 2025-06-17 ENCOUNTER — OFFICE VISIT (OUTPATIENT)
Dept: FAMILY MEDICINE CLINIC | Facility: CLINIC | Age: 55
End: 2025-06-17
Payer: COMMERCIAL

## 2025-06-17 VITALS
HEART RATE: 77 BPM | HEIGHT: 65 IN | RESPIRATION RATE: 18 BRPM | SYSTOLIC BLOOD PRESSURE: 110 MMHG | WEIGHT: 170 LBS | TEMPERATURE: 98 F | DIASTOLIC BLOOD PRESSURE: 80 MMHG | OXYGEN SATURATION: 98 % | BODY MASS INDEX: 28.32 KG/M2

## 2025-06-17 DIAGNOSIS — Z00.00 ROUTINE GENERAL MEDICAL EXAMINATION AT A HEALTH CARE FACILITY: Primary | ICD-10-CM

## 2025-06-17 DIAGNOSIS — M79.672 PAIN OF LEFT HEEL: ICD-10-CM

## 2025-06-17 DIAGNOSIS — Z12.11 SCREENING FOR COLON CANCER: ICD-10-CM

## 2025-06-17 DIAGNOSIS — Z12.4 SCREENING FOR CERVICAL CANCER: ICD-10-CM

## 2025-06-17 DIAGNOSIS — D50.0 IRON DEFICIENCY ANEMIA DUE TO CHRONIC BLOOD LOSS: ICD-10-CM

## 2025-06-17 PROCEDURE — 87624 HPV HI-RISK TYP POOLED RSLT: CPT | Performed by: FAMILY MEDICINE

## 2025-06-17 PROCEDURE — 3074F SYST BP LT 130 MM HG: CPT | Performed by: FAMILY MEDICINE

## 2025-06-17 PROCEDURE — 3079F DIAST BP 80-89 MM HG: CPT | Performed by: FAMILY MEDICINE

## 2025-06-17 PROCEDURE — 3008F BODY MASS INDEX DOCD: CPT | Performed by: FAMILY MEDICINE

## 2025-06-17 PROCEDURE — 99396 PREV VISIT EST AGE 40-64: CPT | Performed by: FAMILY MEDICINE

## 2025-06-17 NOTE — PROGRESS NOTES
Family Medicine Progress Note    Yvonne Washburn is a 54 year old female.  ASSESSMENT AND PLAN:  Yvonne was seen today for physical and other.    Diagnoses and all orders for this visit:    Routine general medical examination at a health care facility  -     CBC With Differential With Platelet; Future  -     Lipid Panel; Future  -     Comp Metabolic Panel (14); Future  -     Assay, Thyroid Stim Hormone; Future    Screening for cervical cancer  -     ThinPrep PAP Smear; Future  -     Hpv Dna  High Risk , Thin Prep Collect; Future    Screening for colon cancer  - colonoscopy scheduled in 2 weeks    Iron deficiency anemia due to chronic blood loss  Iron levels require monitoring due to severe anemia from chronic blood loss.  - Order blood work to check iron levels.  -     Iron And Tibc [E]; Future  -     Ferritin [E]; Future    Pain of left heel  Heel pain likely from heel spur, worsened by walking barefoot, irritating plantar fascia. Proper footwear recommended.  - Order x-ray of the left heel.  - Advise wearing proper footwear with cushioning.  - Recommend avoiding walking barefoot at home.  -     XR HEEL (CALCANEUS) (MIN 2 VIEWS), LEFT (CPT=73650); Future    Age appropriate anticipatory guidance reviewed  Health Maintenance   Topic Date Due    Colorectal Cancer Screening  Never done    Pap Smear  02/24/2019    Pneumococcal Vaccine: 50+ Years (1 of 1 - PCV) Never done    Zoster Vaccines (1 of 2) Never done    Annual Physical  04/03/2025    COVID-19 Vaccine (3 - 2024-25 season) 07/17/2025 (Originally 9/1/2024)    Influenza Vaccine (Season Ended) 10/01/2025    Mammogram  05/30/2026    DTaP,Tdap,and Td Vaccines (3 - Td or Tdap) 04/03/2034    Annual Depression Screening  Completed    Meningococcal B Vaccine  Aged Out         The patient indicates understanding of these issues and agrees to the plan.  Follow-Up: The patient is asked to return in Return in about 1 year (around 6/17/2026)  for annual.    Neelam Dean MD        Chief Complaint   Patient presents with    Physical     The following individual(s) verbally consented to be recorded using ambient AI listening technology and understand that they can each withdraw their consent to this listening technology at any point by asking the clinician to turn off or pause the recording:    Patient name: Yvonne Washburn    HPI:   Yvonne Washburn is a 54 year old female.  History of Present Illness  Yvonne Washburn is a 54 year old female who presents for an annual physical exam.    She is here for a comprehensive physical exam, including a Pap smear and breast exam. She has already completed her mammogram for the year and is scheduled for a colonoscopy in two weeks.    She had an IUD placed on December 24, 2024, which has significantly decreased her menstrual bleeding. Initially, she experienced spotting throughout the month, but now she only spots occasionally. Her last period started with spotting three days ago, and she had minimal spotting today.    She experiences heel pain on the left side, which she describes as a 'little spur' causing discomfort. She typically wears gym shoes and walks barefoot at home. Her mother also had heel spurs.    PAST MEDICAL HISTORY:   Past Medical History[1]  PAST SURGICAL HISTORY:   Past Surgical History[2]  ALLERGY:   Allergies[3]  MEDICATIONS:   Current Medications[4]  FAMILY HISTORY:   Family History[5]  SOCIAL HISTORY:   Short Social Hx on File[6]  REVIEW OF SYSTEMS:   Review of Systems   Constitutional:  Negative for appetite change, fatigue, fever and unexpected weight change.   HENT:  Negative for congestion, ear pain, hearing loss and sore throat.    Eyes:  Negative for discharge, redness and visual disturbance.   Respiratory:  Negative for cough, chest tightness and shortness of breath.    Cardiovascular:  Negative for chest pain and palpitations.   Gastrointestinal:  Negative for abdominal pain, blood  in stool, constipation and nausea.   Endocrine: Negative for cold intolerance, heat intolerance and polyuria.   Genitourinary:  Negative for difficulty urinating, dysuria, frequency and urgency.   Musculoskeletal:  Negative for arthralgias, gait problem, joint swelling and myalgias.   Skin:  Negative for rash.   Allergic/Immunologic: Negative for food allergies.   Neurological:  Negative for dizziness, weakness, numbness and headaches.   Hematological:  Negative for adenopathy. Does not bruise/bleed easily.   Psychiatric/Behavioral:  Negative for dysphoric mood and sleep disturbance. The patient is not nervous/anxious.         PHYSICAL EXAM:   /80   Pulse 77   Temp 98.2 °F (36.8 °C) (Temporal)   Resp 18   Ht 5' 5\" (1.651 m)   Wt 170 lb (77.1 kg)   LMP 05/14/2025 (Exact Date)   SpO2 98%   BMI 28.29 kg/m²     Physical Exam  Constitutional:       General: She is not in acute distress.     Appearance: Normal appearance. She is well-developed.   HENT:      Head: Normocephalic and atraumatic.      Right Ear: Tympanic membrane, ear canal and external ear normal.      Left Ear: Tympanic membrane, ear canal and external ear normal.      Nose: Nose normal.      Mouth/Throat:      Mouth: Mucous membranes are moist.      Pharynx: Oropharynx is clear.   Eyes:      Extraocular Movements: Extraocular movements intact.      Conjunctiva/sclera: Conjunctivae normal.      Pupils: Pupils are equal, round, and reactive to light.   Neck:      Thyroid: No thyromegaly.   Cardiovascular:      Rate and Rhythm: Normal rate and regular rhythm.      Pulses: Normal pulses.      Heart sounds: Normal heart sounds. No murmur heard.  Pulmonary:      Effort: Pulmonary effort is normal. No respiratory distress.      Breath sounds: Normal breath sounds.   Chest:      Chest wall: No tenderness.   Breasts:     Right: Normal. No swelling, inverted nipple, mass, nipple discharge, skin change or tenderness.      Left: Normal. No swelling,  inverted nipple, mass, nipple discharge, skin change or tenderness.   Abdominal:      General: Bowel sounds are normal. There is no distension.      Palpations: Abdomen is soft. There is no hepatomegaly, splenomegaly or mass.      Tenderness: There is no abdominal tenderness.      Hernia: No hernia is present. There is no hernia in the left inguinal area or right inguinal area.   Genitourinary:     General: Normal vulva.      Exam position: Lithotomy position.      Labia:         Right: No rash or lesion.         Left: No rash or lesion.       Urethra: No prolapse, urethral pain or urethral lesion.      Vagina: Normal. No vaginal discharge, erythema, tenderness, bleeding, lesions or prolapsed vaginal walls.      Cervix: Cervical bleeding present. No cervical motion tenderness (IUD threats visualized), discharge, lesion or eversion.   Musculoskeletal:         General: Normal range of motion.      Cervical back: Normal range of motion and neck supple.      Right lower leg: No edema.      Left lower leg: No edema.   Lymphadenopathy:      Cervical: No cervical adenopathy.      Upper Body:      Right upper body: No supraclavicular, axillary or pectoral adenopathy.      Left upper body: No supraclavicular, axillary or pectoral adenopathy.      Lower Body: No right inguinal adenopathy. No left inguinal adenopathy.   Skin:     General: Skin is warm.      Findings: No rash.   Neurological:      General: No focal deficit present.      Mental Status: She is alert and oriented to person, place, and time.      Cranial Nerves: No cranial nerve deficit.      Sensory: No sensory deficit.      Motor: No weakness.      Coordination: Coordination normal.      Gait: Gait normal.      Deep Tendon Reflexes: Reflexes are normal and symmetric. Reflexes normal.   Psychiatric:         Mood and Affect: Mood normal.         Behavior: Behavior normal.         Thought Content: Thought content normal.         Judgment: Judgment normal.                The  [employer] C[employer] Act makes medical notes like these available to patients in the interest of transparency. Please be advised this is a medical document. Medical documents are intended to carry relevant information, facts as evident, and the clinical opinion of the practitioner. The medical note is intended as peer to peer communication and may appear blunt or direct. It is written in medical language and may contain abbreviations or verbiage that are unfamiliar.     Neelam Dean MD             [1]   Past Medical History:   Anemia, unspecified    Body piercing    Decorative tattoo    Essential hypertension, benign    Genital herpes    Hemorrhoids    High blood pressure    History of pregnancy    childbirth x 5    Wears glasses   [2]   Past Surgical History:  Procedure Laterality Date         [3] No Known Allergies  [4]   Current Outpatient Medications   Medication Sig Dispense Refill    PEG 3350-KCl-NaBcb-NaCl-NaSulf (PEG-3350/ELECTROLYTES) 236 g Oral Recon Soln Take as directed by physician 4000 mL 0    Levonorgestrel (MIRENA, 52 MG,) 20 MCG/DAY Intrauterine IUD 20 mcg (1 each total) by Intrauterine route once.      ergocalciferol 1.25 MG (31745 UT) Oral Cap Take 1 capsule (50,000 Units total) by mouth once a week. 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   Tobacco Use    Smoking status: Never    Smokeless tobacco: Never    Tobacco  comments:     nonsmoker   Vaping Use    Vaping status: Never Used   Substance and Sexual Activity    Alcohol use: Not Currently     Alcohol/week: 10.0 standard drinks of alcohol     Types: 10 Standard drinks or equivalent per week     Comment: twice a year    Drug use: No    Sexual activity: Never     Partners: Male   Other Topics Concern    Caffeine Concern No    Stress Concern No    Weight Concern No    Special Diet No    Exercise No    Seat Belt Yes   Social History Narrative    Balanced diet sometimes, no exercise     Social Drivers of Health     Food Insecurity: No Food Insecurity (4/11/2025)    NCSS - Food Insecurity     Worried About Running Out of Food in the Last Year: No     Ran Out of Food in the Last Year: No   Transportation Needs: No Transportation Needs (4/11/2025)    NCSS - Transportation     Lack of Transportation: No   Housing Stability: Not At Risk (4/11/2025)    NCSS - Housing/Utilities     Has Housing: Yes     Worried About Losing Housing: No     Unable to Get Utilities: No

## 2025-06-18 LAB — HPV E6+E7 MRNA CVX QL NAA+PROBE: NEGATIVE

## 2025-07-10 ENCOUNTER — LAB ENCOUNTER (OUTPATIENT)
Dept: LAB | Age: 55
End: 2025-07-10
Attending: STUDENT IN AN ORGANIZED HEALTH CARE EDUCATION/TRAINING PROGRAM
Payer: COMMERCIAL

## 2025-07-10 DIAGNOSIS — D50.0 IRON DEFICIENCY ANEMIA DUE TO CHRONIC BLOOD LOSS: ICD-10-CM

## 2025-07-10 LAB
BASOPHILS # BLD AUTO: 0.06 X10(3) UL (ref 0–0.2)
BASOPHILS NFR BLD AUTO: 1.2 %
EOSINOPHIL # BLD AUTO: 0.17 X10(3) UL (ref 0–0.7)
EOSINOPHIL NFR BLD AUTO: 3.3 %
ERYTHROCYTE [DISTWIDTH] IN BLOOD BY AUTOMATED COUNT: 20.7 %
HCT VFR BLD AUTO: 32.7 % (ref 35–48)
HGB BLD-MCNC: 9.5 G/DL (ref 12–16)
IMM GRANULOCYTES # BLD AUTO: 0.01 X10(3) UL (ref 0–1)
IMM GRANULOCYTES NFR BLD: 0.2 %
LYMPHOCYTES # BLD AUTO: 2.08 X10(3) UL (ref 1–4)
LYMPHOCYTES NFR BLD AUTO: 40.9 %
MCH RBC QN AUTO: 18.3 PG (ref 26–34)
MCHC RBC AUTO-ENTMCNC: 29.1 G/DL (ref 31–37)
MCV RBC AUTO: 62.9 FL (ref 80–100)
MONOCYTES # BLD AUTO: 0.43 X10(3) UL (ref 0.1–1)
MONOCYTES NFR BLD AUTO: 8.5 %
NEUTROPHILS # BLD AUTO: 2.33 X10 (3) UL (ref 1.5–7.7)
NEUTROPHILS # BLD AUTO: 2.33 X10(3) UL (ref 1.5–7.7)
NEUTROPHILS NFR BLD AUTO: 45.9 %
PLATELET # BLD AUTO: 303 10(3)UL (ref 150–450)
RBC # BLD AUTO: 5.2 X10(6)UL (ref 3.8–5.3)
WBC # BLD AUTO: 5.1 X10(3) UL (ref 4–11)

## 2025-07-10 PROCEDURE — 36415 COLL VENOUS BLD VENIPUNCTURE: CPT

## 2025-07-10 PROCEDURE — 85025 COMPLETE CBC W/AUTO DIFF WBC: CPT

## 2025-07-14 PROBLEM — Z12.11 SPECIAL SCREENING FOR MALIGNANT NEOPLASMS, COLON: Status: ACTIVE | Noted: 2025-07-14

## 2025-07-31 DIAGNOSIS — E55.9 VITAMIN D DEFICIENCY: ICD-10-CM

## 2025-08-04 RX ORDER — ERGOCALCIFEROL 1.25 MG/1
50000 CAPSULE, LIQUID FILLED ORAL WEEKLY
OUTPATIENT
Start: 2025-08-04

## (undated) NOTE — Clinical Note
I saw Erika Washington in the [facility] in [facility] today for UTI,  she was treated with Bactrim DS and culture was sent. Hope will follow up with you if no better or as needed.  Thank you for the opportunity to care for [address] [facility] [address] [place]